# Patient Record
Sex: FEMALE | Race: BLACK OR AFRICAN AMERICAN | NOT HISPANIC OR LATINO | Employment: FULL TIME | URBAN - METROPOLITAN AREA
[De-identification: names, ages, dates, MRNs, and addresses within clinical notes are randomized per-mention and may not be internally consistent; named-entity substitution may affect disease eponyms.]

---

## 2017-05-09 ENCOUNTER — CLINICAL SUPPORT (OUTPATIENT)
Dept: REHABILITATION | Facility: HOSPITAL | Age: 32
End: 2017-05-09
Attending: ORTHOPAEDIC SURGERY
Payer: MEDICAID
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
## 2017-05-09 DIAGNOSIS — M25.673 DECREASED RANGE OF MOTION OF ANKLE: ICD-10-CM

## 2017-05-09 DIAGNOSIS — M21.372 FOOT DROP, LEFT FOOT: ICD-10-CM

## 2017-05-09 DIAGNOSIS — M25.669 DECREASED RANGE OF MOTION (ROM) OF KNEE: ICD-10-CM

## 2017-05-09 PROCEDURE — 97110 THERAPEUTIC EXERCISES: CPT | Mod: PN

## 2017-05-09 PROCEDURE — G8978 MOBILITY CURRENT STATUS: HCPCS | Mod: CL,PN

## 2017-05-09 PROCEDURE — G8979 MOBILITY GOAL STATUS: HCPCS | Mod: CK,PN

## 2017-05-09 PROCEDURE — 97161 PT EVAL LOW COMPLEX 20 MIN: CPT | Mod: PN
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
## 2017-05-11 ENCOUNTER — CLINICAL SUPPORT (OUTPATIENT)
Dept: REHABILITATION | Facility: HOSPITAL | Age: 32
End: 2017-05-11
Attending: ORTHOPAEDIC SURGERY
Payer: MEDICAID

## 2017-05-11 DIAGNOSIS — M21.372 FOOT DROP, LEFT FOOT: ICD-10-CM

## 2017-05-11 DIAGNOSIS — M25.669 DECREASED RANGE OF MOTION (ROM) OF KNEE: ICD-10-CM

## 2017-05-11 DIAGNOSIS — M25.673 DECREASED RANGE OF MOTION OF ANKLE: ICD-10-CM

## 2017-05-11 PROCEDURE — 97116 GAIT TRAINING THERAPY: CPT | Mod: PN

## 2017-05-11 PROCEDURE — 97110 THERAPEUTIC EXERCISES: CPT | Mod: PN

## 2017-05-11 NOTE — PROGRESS NOTES
"Name: Kath Mcclellan  [facility] Number: [id number]  Date of Treatment: 05/11/2017   Diagnosis:   Encounter Diagnoses   Name Primary?    Foot drop, left foot     Decreased range of motion (ROM) of knee     Decreased range of motion of ankle        Time in: 1605  Time Out: 1710  Total Treatment Time: 65  Group Time: 0      Subjective:    Kath reports needing assist with using stairs.  Patient reports their pain to be 0/10 on a 0-10 scale with 0 being no pain and 10 being the worst pain imaginable.    Objective    Kath received therapeutic exercises to develop strength, endurance, flexibility, posture and balance for 40 minutes including:     Hamstring stretch 3x30" L long sit  Gastroc/plantar fascia stretch 3x30" L long sit with towel  Supine ex 2x10: AP, Heel slide, SLR with min assist  Sit ex 2x10: AP, LAQ with assist, ball squeeze    Gait training 25 minutes with B axillary crutches, CGA, cueing for sequencing 150 feet. Amb with ' with cues for sequencing to increase L LE WB.  Up/down 6 steps with B axillary crutches CGA and cueing for sequencing    Written Home Exercises Provided: yes    Pt demo good understanding of the education provided. Kath demonstrated fair return demonstration of activities with assist and cueing.     Assessment:     Pt will continue to benefit from skilled PT intervention. Medical Necessity is demonstrated by:  Fall Risk, Unable to participate in daily activities, Continued inability to participate in vocational pursuits, Pain limits function of effected part for some activities, Unable to participate fully in daily activities, Requires skilled supervision to complete and progress HEP and Weakness.    Plan:    Continue with established Plan of Care towards PT goals.   "

## 2017-05-15 ENCOUNTER — CLINICAL SUPPORT (OUTPATIENT)
Dept: REHABILITATION | Facility: HOSPITAL | Age: 32
End: 2017-05-15
Attending: ORTHOPAEDIC SURGERY
Payer: MEDICAID

## 2017-05-15 DIAGNOSIS — M25.669 DECREASED RANGE OF MOTION (ROM) OF KNEE: ICD-10-CM

## 2017-05-15 DIAGNOSIS — M25.673 DECREASED RANGE OF MOTION OF ANKLE: ICD-10-CM

## 2017-05-15 DIAGNOSIS — M21.372 FOOT DROP, LEFT FOOT: ICD-10-CM

## 2017-05-15 PROCEDURE — 97110 THERAPEUTIC EXERCISES: CPT | Mod: PN

## 2017-05-17 ENCOUNTER — CLINICAL SUPPORT (OUTPATIENT)
Dept: REHABILITATION | Facility: HOSPITAL | Age: 32
End: 2017-05-17
Attending: ORTHOPAEDIC SURGERY
Payer: MEDICAID

## 2017-05-17 DIAGNOSIS — M21.372 FOOT DROP, LEFT FOOT: ICD-10-CM

## 2017-05-17 DIAGNOSIS — M25.669 DECREASED RANGE OF MOTION (ROM) OF KNEE: ICD-10-CM

## 2017-05-17 DIAGNOSIS — M25.673 DECREASED RANGE OF MOTION OF ANKLE: ICD-10-CM

## 2017-05-17 PROCEDURE — 97110 THERAPEUTIC EXERCISES: CPT | Mod: PN

## 2017-05-17 NOTE — PROGRESS NOTES
"Name: Kath Mcclellan  [facility] Number: [id number]  Date of Treatment: 05/17/2017   Diagnosis:   Encounter Diagnoses   Name Primary?    Foot drop, left foot     Decreased range of motion (ROM) of knee     Decreased range of motion of ankle        Time in: 1110  Time Out: 1205  Total Treatment Time: 55  Group Time: 0    Subjective:    Kath reports improvement of symptoms.  Patient reports their pain to be 0/10 on a 0-10 scale with 0 being no pain and 10 being the worst pain imaginable.    Objective    Kath received therapeutic exercises to develop strength, endurance and flexibility for 55 minutes including:     Bike Lv1 10' without rotation  Gastroc stretch in sit with towel 3x30"  SAQ 3x10 B  SAQ with ball squeeze 3x10 B  LAQ 3x10 B  SLR 3x10 L with VC to decrease L hip IR    Gair training Axillary crutches 2x75' with cues to increase L hip flexion(dec L IR), decrease L step length and increase R step length      Pt demo good understanding of the education provided. Kath demonstrated good return demonstration of activities.     Assessment:     Pt will continue to benefit from skilled PT intervention. Medical Necessity is demonstrated by:  Continued inability to participate in vocational pursuits, Unable to participate fully in daily activities, Requires skilled supervision to complete and progress HEP and Weakness.    Patient is making fair progress towards established goals.    Plan:    Continue with established Plan of Care towards PT goals.   "

## 2017-05-22 ENCOUNTER — CLINICAL SUPPORT (OUTPATIENT)
Dept: REHABILITATION | Facility: HOSPITAL | Age: 32
End: 2017-05-22
Attending: ORTHOPAEDIC SURGERY
Payer: MEDICAID

## 2017-05-22 DIAGNOSIS — R26.9 GAIT ABNORMALITY: Primary | ICD-10-CM

## 2017-05-22 PROCEDURE — 97110 THERAPEUTIC EXERCISES: CPT | Mod: PN

## 2017-05-22 NOTE — PROGRESS NOTES
05/22/17 1100   Ankle Exercises   Double Leg Calf Raises Reps/Sets/Weight 30   Standing Dorsiflexion Stretch(Gastroc) Reps/Sets/Hold Time 30   Knee Exercises   Frontal Squats Reps/Sets/Weight 30   Additional Exercises   Additional Exercise nustep 12' l5   Additional Exercise MTT 12',GAIT TRAINING, 10', BALANCE EX 8'

## 2017-05-22 NOTE — PROGRESS NOTES
TIME RECORD    Date:  05/22/2017    Start Time:  1102  Stop Time:  1155    PROCEDURES:    TIMED  Procedure Time Min.   TE Start:1102  Stop:1155 53    Start:  Stop:     Start:  Stop:     Start:  Stop:          UNTIMED  Procedure Time Min.    Start:  Stop:     Start:  Stop:      Total Timed Minutes:  53  Total Timed Units:  4  Total Untimed Units:  0  Charges Billed/# of units:  4      Progress/Current Status    Subjective:     Patient ID: Kath Mcclellan is a 32 y.o. female.  Diagnosis:   1. Gait abnormality       Pain: 0 /10      Objective:     TE for ROM,strength,gait.    Assessment:     Performed ex well.    Patient Education/Response:     Gait pattern ed..    Plans and Goals:     Cont,progress as able.

## 2017-05-24 ENCOUNTER — CLINICAL SUPPORT (OUTPATIENT)
Dept: REHABILITATION | Facility: HOSPITAL | Age: 32
End: 2017-05-24
Attending: ORTHOPAEDIC SURGERY
Payer: MEDICAID

## 2017-05-24 DIAGNOSIS — M21.372 FOOT DROP, LEFT FOOT: ICD-10-CM

## 2017-05-24 DIAGNOSIS — M25.669 DECREASED RANGE OF MOTION (ROM) OF KNEE: ICD-10-CM

## 2017-05-24 DIAGNOSIS — M25.673 DECREASED RANGE OF MOTION OF ANKLE: ICD-10-CM

## 2017-05-24 PROCEDURE — 97110 THERAPEUTIC EXERCISES: CPT | Mod: PN

## 2017-05-24 NOTE — PROGRESS NOTES
"Name: Kath Mcclellan  [facility] Number: [id number]  Date of Treatment: 05/24/2017   Diagnosis:   Encounter Diagnoses   Name Primary?    Foot drop, left foot     Decreased range of motion (ROM) of knee     Decreased range of motion of ankle        Time in: 1100  Time Out: 1158  Total Treatment Time: 58      Subjective:    Kath reports "my leg doesn't hurt, I just don't trust it."  Patient reports their pain to be 0/10 on a 0-10 scale with 0 being no pain and 10 being the worst pain imaginable.    Objective    Patient received individual therapy to increase strength, endurance, ROM, flexibility and gait training with activities as follows:     Kath received therapeutic exercises to develop strength, endurance, ROM, flexibility and gait training for 58 minutes including:     Gait training: ~75' x 2 with AC    // bars with mirror for biofeedback:  Stepping forward/backward with L LE with emphasis on HS and knee flexion 2x10  HSC R/L with AAROM on L 2x10 each  Standing hip flexion R/L 2x10 each  Fwd/retro walks ~8' x2 each    Seated heel slide L 10x10" with over pressure from R LE  LAQ L LE 2x10      Written Home Exercises Provided: Continue HEP  Pt demo good understanding of the education provided. Kath demonstrated good return demonstration of activities.     Assessment:     Pt ambulated from waiting room with WBOS using AC.  Decreased step length with step to gait pattern on R noted as well as compensation from L hip to advance LE. Pt ambulated with decreased knee flexion, and decreased WB on L and increased WB through UE's.   Cues were provided to increase step length on R LE and to promote HS.  Pt demonstrated ability to DF toes on L actively while standing, not during ambulation.  AC readjusted to appropriate height and pt instructed to ambulate with AC closer to body.  Cues provided to increase step length on R with good results.      Mirror provided as biofeedback in // bars, with good results for awareness " of compensatory patterns.  Static standing with even weight distribution encouraged in  // bars due to tendency to shift pelvis/shoulders to R and WB through R LE.  Pt required cues in standing exercises to prevent hip rotation/elevation, encourage HS, and encourage knee flexion.  Tactile cues provided to promote increased ROM during HSC.  Pt responded well to all cues and demonstrated improved technique.      Pt required V/C to decrease WB through UE's.  Pt hesitant to perform hip flexion on R LE due to fear of supporting weight on L LE, SBA was provided, and after initial ~2 reps, pt was able to complete exercise without hesitation and weight bearing on L improved significantly.  Pt ambulated with improved step length, improved HS, improved WB on L LE, decreased WB on UE's with AC, and decreased compensation from pelvis at end of therapy.  Pt was able to ambulate with knee ext, HS, and DF upon completion of therapy with AC.  Pt was able to tolerate exercises without increase in s/s.     Pt will continue to benefit from skilled PT intervention. Medical Necessity is demonstrated by:  Fall Risk, Unable to participate fully in daily activities, Requires skilled supervision to complete and progress HEP and Weakness.    Patient is making good progress towards established goals.      Plan:  Continue with established Plan of Care towards PT goals.   I certify that I was present in the room directing the student in service delivery and guiding them using my skilled judgment. As the co-signing therapist I have reviewed the students documentation and am responsible for the treatment, assessment, and plan.

## 2017-05-29 ENCOUNTER — CLINICAL SUPPORT (OUTPATIENT)
Dept: REHABILITATION | Facility: HOSPITAL | Age: 32
End: 2017-05-29
Attending: ORTHOPAEDIC SURGERY
Payer: MEDICAID

## 2017-05-29 DIAGNOSIS — R26.9 GAIT ABNORMALITY: Primary | ICD-10-CM

## 2017-05-29 PROCEDURE — 97110 THERAPEUTIC EXERCISES: CPT | Mod: PN

## 2017-05-29 NOTE — PROGRESS NOTES
TIME RECORD    Date:  05/29/2017    Start Time:  1303  Stop Time:  1356    PROCEDURES:    TIMED  Procedure Time Min.   TE Start:1303  Stop:1356 53    Start:  Stop:     Start:  Stop:     Start:  Stop:          UNTIMED  Procedure Time Min.    Start:  Stop:     Start:  Stop:      Total Timed Minutes:  53  Total Timed Units:  4  Total Untimed Units:  0  Charges Billed/# of units:  4      Progress/Current Status    Subjective:     Patient ID: Kath Mcclellan is a 32 y.o. female.  Diagnosis:   1. Gait abnormality       Pain: 0 /10      Objective:     TE for ROM,strength, gait, gait training without AD.     Assessment:     Good progress.    Patient Education/Response:     Gait pattern ed.    Plans and Goals:     Cont per POC.

## 2017-05-29 NOTE — PROGRESS NOTES
05/29/17 1300   Ankle Exercises   Double Leg Calf Raises Reps/Sets/Weight 30   Standing Dorsiflexion Stretch(Gastroc) Reps/Sets/Hold Time 3X30   Knee Exercises   Frontal Squats Reps/Sets/Weight 30   Tg/Shuttle/Reformer Squats Reps/Sets/Weight/Level 50# 7'   Additional Exercises   Additional Exercise NUSTEP 12' L6   Additional Exercise GAIT TRAINING WITHOUT AD   Additional Exercise DF-100 22# 3X10.

## 2017-05-31 ENCOUNTER — CLINICAL SUPPORT (OUTPATIENT)
Dept: REHABILITATION | Facility: HOSPITAL | Age: 32
End: 2017-05-31
Attending: ORTHOPAEDIC SURGERY
Payer: MEDICAID

## 2017-05-31 DIAGNOSIS — M21.372 FOOT DROP, LEFT FOOT: ICD-10-CM

## 2017-05-31 DIAGNOSIS — M25.673 DECREASED RANGE OF MOTION OF ANKLE: ICD-10-CM

## 2017-05-31 DIAGNOSIS — M25.669 DECREASED RANGE OF MOTION (ROM) OF KNEE: ICD-10-CM

## 2017-05-31 PROCEDURE — 97110 THERAPEUTIC EXERCISES: CPT | Mod: PN

## 2017-05-31 NOTE — PROGRESS NOTES
"Name: Kath Mcclellan  [facility] Number: [id number]  Date of Treatment: 05/31/2017   Diagnosis:   Encounter Diagnoses   Name Primary?    Foot drop, left foot     Decreased range of motion (ROM) of knee     Decreased range of motion of ankle        Time in: 1100  Time Out: 1150  Total Treatment Time: 50      Subjective:    Kath reports she cleaned her house this morning.  Pt reports feeling tired.  Patient reports their pain to be 0/10 on a 0-10 scale with 0 being no pain and 10 being the worst pain imaginable.    Objective    Patient received individual therapy to increase strength, endurance, ROM, flexibility and gait training with activities as follows:     Kath received therapeutic exercises to develop strength, endurance, ROM, flexibility and gait trainig for 50 minutes including:     NuStep level 3 x 10'  Standing B gastroc stretch on 1/2 roll in // bars 3x30"  Gait training with single AC 20'  Fwd/back step L in // bars 2x10 with single HHA on bar  Standing hip ext R/L in // bars with single HHA on bar 3x10 each  Mini squats in // bars 3x10 w/TC to maintain hips in midline  Standing HSC L 2x10  LAQ R/L 3x10  Gait training with AC 75' x 2    Written Home Exercises Provided: Continue HEP  Pt demo good understanding of the education provided. Kath demonstrated good return demonstration of activities.     Assessment:     Pt required V/C to prevent excessive L hip elevation/rotation during stepping exercise.  Pt was able to ambulate 20' with single AC without increase in s/s.  Tactile cues provided to maintain neutral hip alignment during squats and to promote equal weight distribution through B LE's.  AAROM at end range of HSC provided for ~10 reps until pt was able to improve AROM independently.  Pt required increased rest breaks during HSC due to fatigue, performed rest of exercises seated.  Therapy ended early due to patient fatigue level.     Pt will continue to benefit from skilled PT intervention. " Medical Necessity is demonstrated by:  Unable to participate fully in daily activities, Requires skilled supervision to complete and progress HEP and Weakness.    Patient is making good progress towards established goals.      Plan:  Continue with established Plan of Care towards PT goals.   I certify that I was present in the room directing the student in service delivery and guiding them using my skilled judgment. As the co-signing therapist I have reviewed the students documentation and am responsible for the treatment, assessment, and plan.

## 2017-06-05 ENCOUNTER — CLINICAL SUPPORT (OUTPATIENT)
Dept: REHABILITATION | Facility: HOSPITAL | Age: 32
End: 2017-06-05
Attending: ORTHOPAEDIC SURGERY
Payer: MEDICAID

## 2017-06-05 DIAGNOSIS — M25.673 DECREASED RANGE OF MOTION OF ANKLE: ICD-10-CM

## 2017-06-05 DIAGNOSIS — M25.669 DECREASED RANGE OF MOTION (ROM) OF KNEE: ICD-10-CM

## 2017-06-05 DIAGNOSIS — M21.372 FOOT DROP, LEFT FOOT: ICD-10-CM

## 2017-06-05 PROCEDURE — 97110 THERAPEUTIC EXERCISES: CPT | Mod: PN

## 2017-06-05 NOTE — PROGRESS NOTES
"Name: Kath Mcclellan  [facility] Number: [id number]  Date of Treatment: 06/05/2017   Diagnosis:   Encounter Diagnoses   Name Primary?    Foot drop, left foot     Decreased range of motion (ROM) of knee     Decreased range of motion of ankle        Time in: 1100  Time Out: 1200  Total Treatment Time: 60  Group Time: 0      Subjective:    Kath reports improvement of symptoms, reports going to gym to ride bike.  Patient reports their pain to be 0/10 on a 0-10 scale with 0 being no pain and 10 being the worst pain imaginable.    Objective    Kath received therapeutic exercises to develop strength, endurance, flexibility and balance for 60 minutes including:     Bike Lv1 10' with seat at 6 without rotation  Hamstring stretch 3x30" B in sit  Calf stretch 3x30" B 1/2 roll  // bar stand ex 2x10: HR/TR,squats,march,hip abd reciprocal,hip ext reciprocal,hamstring curls reciprocal  Prone HS curl L 3x10  Hip 4-way 3x10 L on mat  Heelslide 3x10 L supine    Amb with U axillary crutch on R 2x80'    Written Home Exercises Provided: yes    Pt demo good understanding of the education provided. Kath demonstrated good return demonstration of activities.     Assessment:     Pt will continue to benefit from skilled PT intervention. Medical Necessity is demonstrated by:  Continued inability to participate in vocational pursuits, Unable to participate fully in daily activities, Requires skilled supervision to complete and progress HEP and Weakness.    Patient is making good progress towards established goals.    Plan:    Continue with established Plan of Care towards PT goals.   "

## 2017-06-07 ENCOUNTER — CLINICAL SUPPORT (OUTPATIENT)
Dept: REHABILITATION | Facility: HOSPITAL | Age: 32
End: 2017-06-07
Attending: ORTHOPAEDIC SURGERY
Payer: MEDICAID

## 2017-06-07 DIAGNOSIS — M25.673 DECREASED RANGE OF MOTION OF ANKLE: ICD-10-CM

## 2017-06-07 DIAGNOSIS — M21.372 FOOT DROP, LEFT FOOT: ICD-10-CM

## 2017-06-07 DIAGNOSIS — M25.669 DECREASED RANGE OF MOTION (ROM) OF KNEE: ICD-10-CM

## 2017-06-07 PROCEDURE — 97110 THERAPEUTIC EXERCISES: CPT | Mod: PN

## 2017-06-07 NOTE — PROGRESS NOTES
"Name: Kath Mcclellan  [facility] Number: [id number]  Date of Treatment: 06/07/2017   Diagnosis:   Encounter Diagnoses   Name Primary?    Foot drop, left foot     Decreased range of motion (ROM) of knee     Decreased range of motion of ankle        Time in: 1400  Time Out: 1500  Total Treatment Time: 60      Subjective:    Kath reports doing better overall. "This crutch has been bothering me for two days."  Pt arrived to therapy with R axillary crutch.  Patient reports their pain to be 0/10 on a 0-10 scale with 0 being no pain and 10 being the worst pain imaginable.    Objective    Patient received individual therapy to increase strength, endurance and balance, gait with activities as follows:     Kath received therapeutic exercises to develop strength, endurance and balance, gait for 60 minutes including:     Bike level 1 x 10 minutes, seat at 6, then ending at 2  Mini squats x 30  Step to 6" step x 30  Step up 4' step x 30  Standing HSC x 30  Sit-stand w/LLE back further than R x 5  Shuttle: 50# B, 25# L x 30 each; heel dips 25# x 30 B      VC during ambulation to decrease weight shift to R, increase knee flexion with toe off, increase heel strike and hip flexion with swing through and heel strike phase of ambulation.  VC to relax shoulders, move R crutch with LLE.  Pt was able to return demonstrate proper gait technique.    Written Home Exercises Provided: cont HEP  Pt demo good understanding of the education provided. Kath demonstrated good return demonstration of activities.     Assessment:     Pt fatigues quickly.  Needs frequent rest breaks.    Pt will continue to benefit from skilled PT intervention. Medical Necessity is demonstrated by:  Fall Risk, Pain limits function of effected part for some activities, Unable to participate fully in daily activities, Requires skilled supervision to complete and progress HEP.    Patient is making good progress towards established goals.      Plan:  Continue with " established Plan of Care towards PT goals.

## 2017-06-12 ENCOUNTER — CLINICAL SUPPORT (OUTPATIENT)
Dept: REHABILITATION | Facility: HOSPITAL | Age: 32
End: 2017-06-12
Attending: ORTHOPAEDIC SURGERY
Payer: MEDICAID

## 2017-06-12 DIAGNOSIS — M25.669 DECREASED RANGE OF MOTION (ROM) OF KNEE: ICD-10-CM

## 2017-06-12 DIAGNOSIS — M21.372 FOOT DROP, LEFT FOOT: ICD-10-CM

## 2017-06-12 DIAGNOSIS — M25.673 DECREASED RANGE OF MOTION OF ANKLE: ICD-10-CM

## 2017-06-12 PROCEDURE — 97110 THERAPEUTIC EXERCISES: CPT | Mod: PN

## 2017-06-12 NOTE — PROGRESS NOTES
Name: Kath Mcclellan  [facility] Number: [id number]  Date of Treatment: 06/12/2017   Diagnosis:   Encounter Diagnoses   Name Primary?    Foot drop, left foot     Decreased range of motion (ROM) of knee     Decreased range of motion of ankle        Time in: 1058  Time Out: 1155  Total Treatment Time: 57      Subjective:    Kath reports pain L hamstring region this past weekend.  Patient reports their pain to be 0/10 on a 0-10 scale with 0 being no pain and 10 being the worst pain imaginable.    Objective    Patient received individual therapy to increase strength, endurance, flexibility and balance, gait with activities as follows:     Kath received therapeutic exercises to develop strength, endurance, flexibility and balance, gait for 42 minutes including:     EFX x 10 minutes  Shuttle: 50# B, 31# L, 31# heel dips x 30 each  [employer] flexion, extension 11# x 30 each  Mini squats w/R forward, x 30  SLS 3 x 30 sec L  Gait training w/SPC, 80' total, VC for correct technique    Kath received the following manual therapy techniques: Scar massage anterior knee, desensitization L ant thigh, medial/lateral knee and lower leg, STM inferior gastroc region,PROM knee flexion for 15 minutes.     Written Home Exercises Provided: cont HEP, instructed in desensitization LLE, toe raises  Pt demo good understanding of the education provided. Kath demonstrated good return demonstration of activities.     Assessment:     Sensitivity with MT, which decreased after MT completed.  Decreased heel strike and knee extension, increased trunk forward thrust with LLE swing through and heel strike phase.  Good tolerance for activity.  Making good progress overall.    Pt will continue to benefit from skilled PT intervention. Medical Necessity is demonstrated by:  Fall Risk, Unable to participate fully in daily activities, Requires skilled supervision to complete and progress HEP and Weakness.    Patient is making good progress towards  established goals.      Plan:  Continue with established Plan of Care towards PT goals.

## 2017-06-20 ENCOUNTER — CLINICAL SUPPORT (OUTPATIENT)
Dept: REHABILITATION | Facility: HOSPITAL | Age: 32
End: 2017-06-20
Attending: ORTHOPAEDIC SURGERY
Payer: MEDICAID
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
## 2017-06-20 PROCEDURE — 97110 THERAPEUTIC EXERCISES: CPT | Mod: PN
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
## 2017-06-22 ENCOUNTER — CLINICAL SUPPORT (OUTPATIENT)
Dept: REHABILITATION | Facility: HOSPITAL | Age: 32
End: 2017-06-22
Attending: ORTHOPAEDIC SURGERY
Payer: MEDICAID

## 2017-06-22 DIAGNOSIS — M21.372 FOOT DROP, LEFT FOOT: ICD-10-CM

## 2017-06-22 DIAGNOSIS — M25.673 DECREASED RANGE OF MOTION OF ANKLE: ICD-10-CM

## 2017-06-22 DIAGNOSIS — M25.669 DECREASED RANGE OF MOTION (ROM) OF KNEE: ICD-10-CM

## 2017-06-22 PROCEDURE — 97110 THERAPEUTIC EXERCISES: CPT | Mod: PN

## 2017-06-22 NOTE — PROGRESS NOTES
"Name: Kath Mcclellan  [facility] Number: [id number]  Date of Treatment: 06/22/2017   Diagnosis:   Encounter Diagnoses   Name Primary?    Foot drop, left foot     Decreased range of motion (ROM) of knee     Decreased range of motion of ankle        Time in: 1510  Time Out: 1600  Total Treatment Time: 50  Group Time: 0      Subjective:    Kath reports improvement of symptoms, reports doing HEP.  Patient reports their pain to be 0/10 on a 0-10 scale with 0 being no pain and 10 being the worst pain imaginable.    Objective    Kath received therapeutic exercises to develop strength, endurance, flexibility, posture and balance for 50 minutes including:     Bike Lv3 10'  Amb with ' SBA VC to decrease ELICIA  Up/Down 21 6" steps with SC, step through up, step to down  Shuttle x30: 50# B squat, 37# L squat, 37# B HR  Hip 4-way 2# L 3x10  Step/over 4" box with R LE with R UE support 3x10  March x20  Hamstring curl x20    Pt demo good understanding of the education provided. aKth demonstrated good return demonstration of activities.     Assessment:     Pt will continue to benefit from skilled PT intervention. Medical Necessity is demonstrated by:  Continued inability to participate in vocational pursuits, Unable to participate fully in daily activities, Requires skilled supervision to complete and progress HEP and Weakness.    Patient is making good progress towards established goals.    Plan:    Continue with established Plan of Care towards PT goals.   "

## 2017-06-26 ENCOUNTER — CLINICAL SUPPORT (OUTPATIENT)
Dept: REHABILITATION | Facility: HOSPITAL | Age: 32
End: 2017-06-26
Attending: ORTHOPAEDIC SURGERY
Payer: MEDICAID

## 2017-06-26 DIAGNOSIS — M21.372 FOOT DROP, LEFT FOOT: ICD-10-CM

## 2017-06-26 DIAGNOSIS — M25.669 DECREASED RANGE OF MOTION (ROM) OF KNEE: ICD-10-CM

## 2017-06-26 DIAGNOSIS — M25.673 DECREASED RANGE OF MOTION OF ANKLE: ICD-10-CM

## 2017-06-26 PROCEDURE — 97110 THERAPEUTIC EXERCISES: CPT | Mod: PN

## 2017-06-26 NOTE — PROGRESS NOTES
Name: Kath Mcclellan  [facility] Number: [id number]  Date of Treatment: 06/26/2017   Diagnosis:   Encounter Diagnoses   Name Primary?    Foot drop, left foot     Decreased range of motion (ROM) of knee     Decreased range of motion of ankle        Time in: 1102  Time Out: 1150  Total Treatment Time: 48      Subjective:    Kath reports pain with sciatica today.  Patient reports their pain to be 0/10 on a 0-10 scale with 0 being no pain and 10 being the worst pain imaginable.  0/10 in leg, 2/10 in sciatica     Objective    Patient received individual therapy to increase strength, flexibility and gait with activities as follows:     Kath received therapeutic exercises to develop strength, flexibility and gait for 38 minutes including:     Nustep level 3 x 10 minutes  Standing gastroc stretch 3 x 30 sec B  Shuttle: 56# B, 37# L x 30 each  SportsArt: flexion, extension 11# x 30 each LLE    Kath received the following manual therapy techniques: scar massage were applied to the: L knee incision and L anterior tib scar region for 10 minutes.     Written Home Exercises Provided: cont HEP  Pt demo good understanding of the education provided. Kath demonstrated good return demonstration of activities.     Assessment:     Limited activity level today due to sciatic pain today.  Scar heigh and improved skin mobility at anterior tib region decreased after MT.    Pt will continue to benefit from skilled PT intervention. Medical Necessity is demonstrated by:  Fall Risk, Pain limits function of effected part for some activities, Unable to participate fully in daily activities, Requires skilled supervision to complete and progress HEP and Weakness.    Patient is making good progress towards established goals.      Plan:  Continue with established Plan of Care towards PT goals.

## 2017-06-28 ENCOUNTER — CLINICAL SUPPORT (OUTPATIENT)
Dept: REHABILITATION | Facility: HOSPITAL | Age: 32
End: 2017-06-28
Attending: ORTHOPAEDIC SURGERY
Payer: MEDICAID
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
## 2017-06-28 PROCEDURE — G8979 MOBILITY GOAL STATUS: HCPCS | Mod: CJ,PN

## 2017-06-28 PROCEDURE — G8978 MOBILITY CURRENT STATUS: HCPCS | Mod: CJ,PN

## 2017-06-28 PROCEDURE — 97110 THERAPEUTIC EXERCISES: CPT | Mod: PN

## 2017-06-28 PROCEDURE — 97161 PT EVAL LOW COMPLEX 20 MIN: CPT | Mod: PN
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
## 2017-06-28 NOTE — PROGRESS NOTES
Patient would benefit from further PT visits to cont. addressing ROM/strength/mobility deficits.  Pls see POC update.

## 2017-07-03 ENCOUNTER — APPOINTMENT (OUTPATIENT)
Dept: REHABILITATION | Facility: HOSPITAL | Age: 32
End: 2017-07-03
Attending: ORTHOPAEDIC SURGERY
Payer: MEDICAID

## 2017-07-06 ENCOUNTER — TELEPHONE (OUTPATIENT)
Dept: REHABILITATION | Facility: HOSPITAL | Age: 32
End: 2017-07-06

## 2017-07-10 ENCOUNTER — CLINICAL SUPPORT (OUTPATIENT)
Dept: REHABILITATION | Facility: HOSPITAL | Age: 32
End: 2017-07-10
Attending: ORTHOPAEDIC SURGERY
Payer: MEDICAID

## 2017-07-10 DIAGNOSIS — M25.673 DECREASED RANGE OF MOTION OF ANKLE: ICD-10-CM

## 2017-07-10 DIAGNOSIS — M25.669 DECREASED RANGE OF MOTION (ROM) OF KNEE: ICD-10-CM

## 2017-07-10 DIAGNOSIS — M21.372 FOOT DROP, LEFT FOOT: ICD-10-CM

## 2017-07-10 PROCEDURE — 97110 THERAPEUTIC EXERCISES: CPT | Mod: PN
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
## 2017-07-12 ENCOUNTER — CLINICAL SUPPORT (OUTPATIENT)
Dept: REHABILITATION | Facility: HOSPITAL | Age: 32
End: 2017-07-12
Attending: ORTHOPAEDIC SURGERY
Payer: MEDICAID

## 2017-07-12 DIAGNOSIS — S82.402E TIBIA/FIBULA FRACTURE, LEFT, OPEN TYPE I OR II, WITH ROUTINE HEALING, SUBSEQUENT ENCOUNTER: ICD-10-CM
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
## 2017-07-12 DIAGNOSIS — S82.202E TIBIA/FIBULA FRACTURE, LEFT, OPEN TYPE I OR II, WITH ROUTINE HEALING, SUBSEQUENT ENCOUNTER: ICD-10-CM

## 2017-07-12 PROCEDURE — 97110 THERAPEUTIC EXERCISES: CPT | Mod: PN
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
## 2017-07-19 ENCOUNTER — CLINICAL SUPPORT (OUTPATIENT)
Dept: REHABILITATION | Facility: HOSPITAL | Age: 32
End: 2017-07-19
Attending: ORTHOPAEDIC SURGERY
Payer: MEDICAID

## 2017-07-19 DIAGNOSIS — M25.669 DECREASED RANGE OF MOTION (ROM) OF KNEE: ICD-10-CM

## 2017-07-19 DIAGNOSIS — R26.9 GAIT ABNORMALITY: ICD-10-CM

## 2017-07-19 DIAGNOSIS — M25.673 DECREASED RANGE OF MOTION OF ANKLE: ICD-10-CM

## 2017-07-19 DIAGNOSIS — M21.372 FOOT DROP, LEFT FOOT: ICD-10-CM

## 2017-07-19 PROCEDURE — 97110 THERAPEUTIC EXERCISES: CPT | Mod: PN

## 2017-07-21 ENCOUNTER — CLINICAL SUPPORT (OUTPATIENT)
Dept: REHABILITATION | Facility: HOSPITAL | Age: 32
End: 2017-07-21
Attending: ORTHOPAEDIC SURGERY
Payer: MEDICAID

## 2017-07-21 DIAGNOSIS — M21.372 FOOT DROP, LEFT FOOT: ICD-10-CM

## 2017-07-21 DIAGNOSIS — M25.673 DECREASED RANGE OF MOTION OF ANKLE: ICD-10-CM

## 2017-07-21 DIAGNOSIS — M25.669 DECREASED RANGE OF MOTION (ROM) OF KNEE: ICD-10-CM

## 2017-07-21 DIAGNOSIS — R26.9 GAIT ABNORMALITY: ICD-10-CM

## 2017-07-21 PROCEDURE — 97110 THERAPEUTIC EXERCISES: CPT | Mod: PN

## 2017-07-21 NOTE — PROGRESS NOTES
"Name: Kath Mcclellan  [facility] Number: [id number]  Date of Treatment: 07/21/2017   Diagnosis:   Encounter Diagnoses   Name Primary?    Gait abnormality     Foot drop, left foot     Decreased range of motion (ROM) of knee     Decreased range of motion of ankle        Time in: 1200  Time Out: 1300  Total Treatment Time: 60      Subjective:    Kath reports "I went out last night.  I'm just tired."  Patient reports their pain to be 0/10 on a 0-10 scale with 0 being no pain and 10 being the worst pain imaginable.    Objective    Patient received individual therapy to increase strength, flexibility and gait with activities as follows:     Kath received therapeutic exercises to develop strength, flexibility and gait for 50 minutes including:     EFX 5/5  Hip hike 4" step x 30 B  HR on 2" step x 30 B  Standing hip abduction w/GTB x 30 B  Lateral side stepping w/GTB around ankles 35' R/L x 2 each direction  Monster walk w/GTB around ankles 30' x 2  Walking lunges 25' x 4  Ball wall squats x 30  Ball wall squats w/10 sec hold x 3  Shuttle: 62# B, 37# L, 37# heel dips x 30 each  SportsArt: 11# flexion, extension L x 30 each  Bridges with DKC with ball x 30    STM L medial/superior tibial region x 10 minutes    Written Home Exercises Provided: cont HEP  Pt demo good understanding of the education provided. Kath demonstrated good return demonstration of activities.     Assessment:     Good improvement in B hip strength.  Pt with decreased lateral shift during stance phase LLE.  Pt will continue to benefit from skilled PT intervention. Medical Necessity is demonstrated by:  Unable to participate fully in daily activities, Requires skilled supervision to complete and progress HEP and Weakness.    Patient is making good progress towards established goals.      Plan:  Continue with established Plan of Care towards PT goals.   "

## 2017-07-24 ENCOUNTER — CLINICAL SUPPORT (OUTPATIENT)
Dept: REHABILITATION | Facility: HOSPITAL | Age: 32
End: 2017-07-24
Attending: ORTHOPAEDIC SURGERY
Payer: MEDICAID

## 2017-07-24 DIAGNOSIS — M21.372 FOOT DROP, LEFT FOOT: ICD-10-CM

## 2017-07-24 DIAGNOSIS — M25.669 DECREASED RANGE OF MOTION (ROM) OF KNEE: ICD-10-CM

## 2017-07-24 DIAGNOSIS — R26.9 GAIT ABNORMALITY: ICD-10-CM

## 2017-07-24 DIAGNOSIS — M25.673 DECREASED RANGE OF MOTION OF ANKLE: ICD-10-CM

## 2017-07-24 PROCEDURE — 97110 THERAPEUTIC EXERCISES: CPT | Mod: PN

## 2017-07-24 NOTE — PROGRESS NOTES
"Name: Kath Mcclellan  [facility] Number: [id number]  Date of Treatment: 07/24/2017   Diagnosis:   Encounter Diagnoses   Name Primary?    Gait abnormality     Foot drop, left foot     Decreased range of motion (ROM) of knee     Decreased range of motion of ankle        Time in: 1100  Time Out: 1158  Total Treatment Time: 58      Subjective:    Kath reports no complaints.  Patient reports their pain to be 0/10 on a 0-10 scale with 0 being no pain and 10 being the worst pain imaginable.    Objective    Patient received individual therapy to increase strength and endurance with activities as follows:     Kath received therapeutic exercises to develop strength and endurance for 58 minutes including:     EFX 5/5  Pulleys: strap on RLE, 2 plates, hip 4 way on LLE x 30 each  Shuttle: 62# B, 37# L, 37# heel dips x 30 each  BTB around ankles: monster walk 30' x 2, lateral stepping 30' R/L x 2 each  Walking lunges; 30' x 2  Running 30' x 2  Hip hike 4" step x 30 B  TKE 4" step x 30 L  Kinesotaping: scar taping: star pattern to lower/mid tibial region and anterior knee scar reions    Written Home Exercises Provided: cont HEP  Pt demo good understanding of the education provided. Kath demonstrated good return demonstration of activities.     Assessment:     Strength and gait pattern improving.  Decreased lateral trunk lean during ambulation.  Pt will continue to benefit from skilled PT intervention. Medical Necessity is demonstrated by:  Unable to participate fully in daily activities, Requires skilled supervision to complete and progress HEP and Weakness.    Patient is making good progress towards established goals.      Plan:  Continue with established Plan of Care towards PT goals.   "

## 2017-07-31 ENCOUNTER — CLINICAL SUPPORT (OUTPATIENT)
Dept: REHABILITATION | Facility: HOSPITAL | Age: 32
End: 2017-07-31
Attending: ORTHOPAEDIC SURGERY
Payer: MEDICAID
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
## 2017-07-31 DIAGNOSIS — R29.898 DECREASED STRENGTH INVOLVING KNEE JOINT: ICD-10-CM

## 2017-07-31 PROCEDURE — 97110 THERAPEUTIC EXERCISES: CPT | Mod: PN
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
## 2017-08-03 ENCOUNTER — CLINICAL SUPPORT (OUTPATIENT)
Dept: REHABILITATION | Facility: HOSPITAL | Age: 32
End: 2017-08-03
Attending: ORTHOPAEDIC SURGERY
Payer: MEDICAID

## 2017-08-03 DIAGNOSIS — M21.372 FOOT DROP, LEFT FOOT: ICD-10-CM

## 2017-08-03 DIAGNOSIS — R26.9 GAIT ABNORMALITY: ICD-10-CM

## 2017-08-03 DIAGNOSIS — R29.898 DECREASED STRENGTH INVOLVING KNEE JOINT: ICD-10-CM

## 2017-08-03 DIAGNOSIS — M25.669 DECREASED RANGE OF MOTION (ROM) OF KNEE: ICD-10-CM

## 2017-08-03 DIAGNOSIS — M25.673 DECREASED RANGE OF MOTION OF ANKLE: ICD-10-CM

## 2017-08-03 PROCEDURE — 97110 THERAPEUTIC EXERCISES: CPT | Mod: PN

## 2017-08-03 NOTE — PROGRESS NOTES
"Name: Kath Mcclellan  [facility] Number: [id number]  Date of Treatment: 08/03/2017   Diagnosis:   Encounter Diagnoses   Name Primary?    Decreased strength involving knee joint     Gait abnormality     Foot drop, left foot     Decreased range of motion (ROM) of knee     Decreased range of motion of ankle        Time in: 1500  Time Out: 1558  Total Treatment Time: 58      Subjective:    Kath reports tired after being on vacation last few days.  Patient reports their pain to be 0/10 on a 0-10 scale with 0 being no pain and 10 being the worst pain imaginable.    Objective    Patient received individual therapy to increase strength and flexibility with activities as follows:     Kath received therapeutic exercises to develop strength and flexibility for 58 minutes including:     EFX 5/5, RE4  Pulleys: strap on RLE, 2 plates, hip 4 way on LLE x 30 each  Shuttle: 68# B, 37# L, 37# heel dips x 30 each; 25# jumping 1' x 2  BTB around ankles: monster walk 35' x 2, lateral stepping 35' R/L x 2 each  Walking lunges; 25' x 2  Running 30' x 2  Hip hike x 30 B  Ball wall squat 3 x 30 sec  HR 2" step x 30  SportsArt 22# flexion, 11# extension x 30 each  DKC with ball with bridge x 30    Written Home Exercises Provided: cont HEP  Pt demo good understanding of the education provided. Kath demonstrated good return demonstration of activities.     Assessment:     Removed Kinesotape from previous visit.  Scar decreased in firmness.  Making good progress with strength.  Pt will continue to benefit from skilled PT intervention. Medical Necessity is demonstrated by:  Unable to participate fully in daily activities, Requires skilled supervision to complete and progress HEP and Weakness.    Patient is making good progress towards established goals.      Plan:  Continue with established Plan of Care towards PT goals.   "

## 2017-08-09 ENCOUNTER — CLINICAL SUPPORT (OUTPATIENT)
Dept: REHABILITATION | Facility: HOSPITAL | Age: 32
End: 2017-08-09
Attending: ORTHOPAEDIC SURGERY
Payer: MEDICAID

## 2017-08-09 DIAGNOSIS — M25.673 DECREASED RANGE OF MOTION OF ANKLE: ICD-10-CM

## 2017-08-09 DIAGNOSIS — R29.898 DECREASED STRENGTH INVOLVING KNEE JOINT: ICD-10-CM

## 2017-08-09 DIAGNOSIS — M21.372 FOOT DROP, LEFT FOOT: ICD-10-CM

## 2017-08-09 DIAGNOSIS — M25.669 DECREASED RANGE OF MOTION (ROM) OF KNEE: ICD-10-CM

## 2017-08-09 DIAGNOSIS — R26.9 GAIT ABNORMALITY: ICD-10-CM

## 2017-08-09 PROCEDURE — 97110 THERAPEUTIC EXERCISES: CPT | Mod: PN

## 2017-08-09 NOTE — PROGRESS NOTES
Name: Kath Mcclellan  [facility] Number: [id number]  Date of Treatment: 08/09/2017   Diagnosis:   Encounter Diagnoses   Name Primary?    Decreased strength involving knee joint     Gait abnormality     Foot drop, left foot     Decreased range of motion (ROM) of knee     Decreased range of motion of ankle        Time in: 1103  Time Out: 1158  Total Treatment Time: 55      Subjective:    Kath reports tired after vacation this past weekend.  Patient reports their pain to be 0/10 on a 0-10 scale with 0 being no pain and 10 being the worst pain imaginable.    Objective    Patient received individual therapy to increase strength with activities as follows:     Kath received therapeutic exercises to develop strength for 55 minutes including:     EFX 5/5 RE 4  Walking lunges 25' x 2  Shuttle: 68#B, 37# L, 37# heel dip x 30 each; 25# jump 1' x 2  Hip hike x 30 B  Hip flexor stretch tall kneeling L 3 x 30 sec  Ball wall squats x 30  Ball sits 3 x 30 sec  SLR with abd 2# x 30 L  Bridge with DKC with ball x 30  Reverse clams L x 30  SL clams L w/GTB x 30    Written Home Exercises Provided: cont HEP  Pt demo good understanding of the education provided. Kath demonstrated good return demonstration of activities.     Assessment:     L hip weakness remains, especially when pt is fatigued.   Making good progress overall.  Pt will continue to benefit from skilled PT intervention. Medical Necessity is demonstrated by:  Unable to participate fully in daily activities, Requires skilled supervision to complete and progress HEP and Weakness.    Patient is making good progress towards established goals.      Plan:  Continue with established Plan of Care towards PT goals.

## 2017-08-10 ENCOUNTER — CLINICAL SUPPORT (OUTPATIENT)
Dept: REHABILITATION | Facility: HOSPITAL | Age: 32
End: 2017-08-10
Attending: ORTHOPAEDIC SURGERY
Payer: MEDICAID

## 2017-08-10 DIAGNOSIS — R26.9 GAIT ABNORMALITY: ICD-10-CM

## 2017-08-10 DIAGNOSIS — M21.372 FOOT DROP, LEFT FOOT: ICD-10-CM

## 2017-08-10 DIAGNOSIS — M25.669 DECREASED RANGE OF MOTION (ROM) OF KNEE: ICD-10-CM

## 2017-08-10 DIAGNOSIS — R29.898 DECREASED STRENGTH INVOLVING KNEE JOINT: ICD-10-CM
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
## 2017-08-10 DIAGNOSIS — M25.673 DECREASED RANGE OF MOTION OF ANKLE: ICD-10-CM

## 2017-08-10 PROCEDURE — 97110 THERAPEUTIC EXERCISES: CPT | Mod: PN
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
## 2017-08-29 ENCOUNTER — CLINICAL SUPPORT (OUTPATIENT)
Dept: REHABILITATION | Facility: HOSPITAL | Age: 32
End: 2017-08-29
Attending: ORTHOPAEDIC SURGERY
Payer: MEDICAID

## 2017-08-29 DIAGNOSIS — M25.673 DECREASED RANGE OF MOTION OF ANKLE: ICD-10-CM

## 2017-08-29 DIAGNOSIS — M21.372 FOOT DROP, LEFT FOOT: ICD-10-CM

## 2017-08-29 DIAGNOSIS — R26.9 GAIT ABNORMALITY: ICD-10-CM

## 2017-08-29 DIAGNOSIS — R29.898 DECREASED STRENGTH INVOLVING KNEE JOINT: ICD-10-CM

## 2017-08-29 DIAGNOSIS — M25.669 DECREASED RANGE OF MOTION (ROM) OF KNEE: ICD-10-CM

## 2017-08-29 PROCEDURE — 97110 THERAPEUTIC EXERCISES: CPT | Mod: PN

## 2017-08-29 NOTE — PROGRESS NOTES
"Name: Kath Mcclellan  [facility] Number: [id number]  Date of Treatment: 08/29/2017   Diagnosis:   Encounter Diagnoses   Name Primary?    Decreased strength involving knee joint     Gait abnormality     Foot drop, left foot     Decreased range of motion (ROM) of knee     Decreased range of motion of ankle        Time in: 1405  Time Out: 1455  Total Treatment Time: 50  Group Time: 0      Subjective:    Kath reports improvement of symptoms, and not doing HEP "like I should".  Patient reports their pain to be 0/10 on a 0-10 scale with 0 being no pain and 10 being the worst pain imaginable.    Objective    Kath received therapeutic exercises to develop strength, endurance and flexibility for 50 minutes including:     EFX 5'/5'  Hamstring stretch 3x30" R/L longsit  Calf stretch 3x30" 1/2 roll R/L  TM incline 3, 2.7 mph 10' with VC to increase L toe off  Shuttle 37# B jump 2x30  Forward lunge 2x80'  Forward Scoot on stool 2x100'  Up/Down 6 steps x4 step through    Gait training 2x100' with VC to increase L toe off    Pt demo good understanding of the education provided. Kath demonstrated good return demonstration of activities with cues for proper form.     Assessment:     Pt will continue to benefit from skilled PT intervention. Medical Necessity is demonstrated by:  Unable to participate fully in daily activities, Requires skilled supervision to complete and progress HEP and Weakness.    Patient is making good progress towards established goals.    Plan:    Continue with established Plan of Care towards PT goals.   "

## 2017-09-21 ENCOUNTER — DOCUMENTATION ONLY (OUTPATIENT)
Dept: REHABILITATION | Facility: HOSPITAL | Age: 32
End: 2017-09-21

## 2017-09-21 NOTE — PROGRESS NOTES
REHAB SERVICES OUTPATIENT DISCHARGE SUMMARY  Physical Therapy      Name:  Kath Mcclellan  Date:  09/21/17  Date of Evaluation:  05/09/17  Physician:  Dr. Amauri Couch  Total # Of Visits:  25  Cancelled:  7  No Shows:  0  Diagnosis:  No diagnosis found.    Physical/Functional Status:  At time of discharge, patient was able to sherwin. x 60 min of tx time.    The patient is to be discharged from our Therapy service for the following reason(s):  Patient has not attended therapy since 08/29/17.    Degree of Goal Achievement:  Patient has partially met goals    Patient Education:  NA    Discharge Plan:  Other:  Follow up w/ MD

## 2020-03-16 ENCOUNTER — OFFICE VISIT (OUTPATIENT)
Dept: URGENT CARE | Facility: CLINIC | Age: 35
End: 2020-03-16
Payer: MEDICAID

## 2020-03-16 VITALS
DIASTOLIC BLOOD PRESSURE: 87 MMHG | HEIGHT: 63 IN | WEIGHT: 154 LBS | OXYGEN SATURATION: 97 % | SYSTOLIC BLOOD PRESSURE: 121 MMHG | RESPIRATION RATE: 18 BRPM | HEART RATE: 106 BPM | TEMPERATURE: 98 F | BODY MASS INDEX: 27.29 KG/M2

## 2020-03-16 DIAGNOSIS — R50.9 FEVER AND CHILLS: Primary | ICD-10-CM

## 2020-03-16 DIAGNOSIS — R50.9 FEVER AND CHILLS: ICD-10-CM

## 2020-03-16 DIAGNOSIS — R05.9 COUGH: ICD-10-CM

## 2020-03-16 DIAGNOSIS — B96.89 ACUTE BACTERIAL BRONCHITIS: ICD-10-CM

## 2020-03-16 DIAGNOSIS — J20.8 ACUTE BACTERIAL BRONCHITIS: ICD-10-CM

## 2020-03-16 DIAGNOSIS — R05.9 COUGH: Primary | ICD-10-CM

## 2020-03-16 LAB
CTP QC/QA: YES
FLUAV AG NPH QL: NEGATIVE
FLUBV AG NPH QL: NEGATIVE

## 2020-03-16 PROCEDURE — 87804 INFLUENZA ASSAY W/OPTIC: CPT | Mod: QW,S$GLB,, | Performed by: EMERGENCY MEDICINE

## 2020-03-16 PROCEDURE — 87804 POCT INFLUENZA A/B: ICD-10-PCS | Mod: QW,S$GLB,, | Performed by: EMERGENCY MEDICINE

## 2020-03-16 PROCEDURE — 99214 OFFICE O/P EST MOD 30 MIN: CPT | Mod: 25,S$GLB,, | Performed by: EMERGENCY MEDICINE

## 2020-03-16 PROCEDURE — 99214 PR OFFICE/OUTPT VISIT, EST, LEVL IV, 30-39 MIN: ICD-10-PCS | Mod: 25,S$GLB,, | Performed by: EMERGENCY MEDICINE

## 2020-03-16 PROCEDURE — U0002 COVID-19 LAB TEST NON-CDC: HCPCS

## 2020-03-16 RX ORDER — DOXYCYCLINE 100 MG/1
100 CAPSULE ORAL 2 TIMES DAILY
Qty: 20 CAPSULE | Refills: 0 | Status: SHIPPED | OUTPATIENT
Start: 2020-03-16 | End: 2020-03-26

## 2020-03-16 RX ORDER — ALBUTEROL SULFATE 90 UG/1
2 AEROSOL, METERED RESPIRATORY (INHALATION) EVERY 4 HOURS PRN
Qty: 18 G | Refills: 0 | Status: SHIPPED | OUTPATIENT
Start: 2020-03-16 | End: 2021-08-23

## 2020-03-16 NOTE — PATIENT INSTRUCTIONS
REST AND HYDRATE WITH PLENTY OF FLUIDS  DOXYCYCLINE RX  CONTINUE PREVIOUSLY PRESCRIBED COUGH MEDICINE  CONTINUE FLONASE  FLU SWAB NEGATIVE  COVID 19 TEST SENT TO LAB AND WE WILL CONTACT YOU WITH RESULTS WHEN THEY RETURN  IBUPROFEN 600 MG EVERY 6 HOURS FOR FEVER/ACHES  TYLENOL 650 MG EVERY 4-6 HOURS FOR FEVER/ACHES  SEE BRONCHITIS SHEET  SOCIAL DISTANCING AND FREQUENT HANDWASHING      Bronchitis, Antibiotic Treatment (Adult)    Bronchitis is an infection of the air passages (bronchial tubes) in your lungs. It often occurs when you have a cold. This illness is contagious during the first few days and is spread through the air by coughing and sneezing, or by direct contact (touching the sick person and then touching your own eyes, nose, or mouth).  Symptoms of bronchitis include cough with mucus (phlegm) and low-grade fever. Bronchitis usually lasts 7 to 14 days. Mild cases can be treated with simple home remedies. More severe infection is treated with an antibiotic.  Home care  Follow these guidelines when caring for yourself at home:  · If your symptoms are severe, rest at home for the first 2 to 3 days. When you go back to your usual activities, don't let yourself get too tired.  · Do not smoke. Also avoid being exposed to secondhand smoke.  · You may use over-the-counter medicines to control fever or pain, unless another medicine was prescribed. (Note: If you have chronic liver or kidney disease or have ever had a stomach ulcer or gastrointestinal bleeding, talk with your healthcare provider before using these medicines. Also talk to your provider if you are taking medicine to prevent blood clots.) Aspirin should never be given to anyone younger than 18 years of age who is ill with a viral infection or fever. It may cause severe liver or brain damage.  · Your appetite may be poor, so a light diet is fine. Avoid dehydration by drinking 6 to 8 glasses of fluids per day (such as water, soft drinks, sports drinks,  juices, tea, or soup). Extra fluids will help loosen secretions in the nose and lungs.  · Over-the-counter cough, cold, and sore-throat medicines will not shorten the length of the illness, but they may be helpful to reduce symptoms. (Note: Do not use decongestants if you have high blood pressure.)  · Finish all antibiotic medicine. Do this even if you are feeling better after only a few days.  Follow-up care  Follow up with your healthcare provider, or as advised. If you had an X-ray or ECG (electrocardiogram), a specialist will review it. You will be notified of any new findings that may affect your care.  Note: If you are age 65 or older, or if you have a chronic lung disease or condition that affects your immune system, or you smoke, talk to your healthcare provider about having pneumococcal vaccinations and a yearly influenza vaccination (flu shot).  When to seek medical advice  Call your healthcare provider right away if any of these occur:  · Fever of 100.4°F (38°C) or higher  · Coughing up increased amounts of colored sputum  · Weakness, drowsiness, headache, facial pain, ear pain, or a stiff neck  Call 911, or get immediate medical care  Contact emergency services right away if any of these occur.  · Coughing up blood  · Worsening weakness, drowsiness, headache, or stiff neck  · Trouble breathing, wheezing, or pain with breathing  Date Last Reviewed: 9/13/2015  © 7050-1779 [employer]. [address] [place] [address], [place] [address]. All rights reserved. This information is not intended as a substitute for professional medical care. Always follow your healthcare professional's instructions.

## 2020-03-16 NOTE — PROGRESS NOTES
"Subjective:       Patient ID: Kath Mcclellan is a 34 y.o. female.    Vitals:  height is 5' 3" (1.6 m) and weight is 69.9 kg (154 lb). Her temperature is 98.3 °F (36.8 °C). Her blood pressure is 121/87 and her pulse is 106. Her respiration is 18 and oxygen saturation is 97%.     Chief Complaint: Cough    Patient is a 34 year old female with no immunocompormised state, was seen last night in er and had chest xray and flu swab, both negative, was rx flonase and promethazine. 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 Reports cough, no wheezing, no sob, sweating/fever. Symptoms present for over 1 week        Cough   This is a new problem. The current episode started yesterday. The problem has been unchanged. The problem occurs hourly. The cough is productive of sputum. Associated symptoms include chills, a fever and myalgias. Pertinent negatives include no ear pain, eye redness, hemoptysis, rash, sore throat, shortness of breath or wheezing. Nothing aggravates the symptoms. Treatments tried: tylenol, promethazine  The treatment provided mild relief.       Constitution: Positive for chills and fever. Negative for sweating and fatigue.   HENT: Negative for ear pain, congestion, sinus pain, sinus pressure, sore throat and voice change.    Neck: Negative for painful lymph nodes.   Eyes: Negative for eye redness.   Respiratory: Positive for cough. Negative for chest tightness, sputum production, bloody sputum, COPD, shortness of breath, stridor, wheezing and asthma.    Gastrointestinal: Negative for nausea and vomiting.   Musculoskeletal: Positive for muscle ache.   Skin: Negative for rash.   Allergic/Immunologic: Negative for seasonal allergies and asthma.   Hematologic/Lymphatic: Negative for swollen lymph nodes.       Objective:      Physical Exam   Constitutional: She is oriented to person, place, " and time. She appears well-developed and well-nourished. She is cooperative.  Non-toxic appearance. She does not have a sickly appearance. She does not appear ill. No distress.   HENT:   Head: Normocephalic and atraumatic.   Right Ear: Hearing, tympanic membrane, external ear and ear canal normal.   Left Ear: Hearing, tympanic membrane, external ear and ear canal normal.   Nose: No mucosal edema, rhinorrhea or nasal deformity. No epistaxis. Right sinus exhibits no maxillary sinus tenderness and no frontal sinus tenderness. Left sinus exhibits no maxillary sinus tenderness and no frontal sinus tenderness.   Mouth/Throat: Uvula is midline, oropharynx is clear and moist and mucous membranes are normal. No trismus in the jaw. Normal dentition. No uvula swelling. No oropharyngeal exudate, posterior oropharyngeal edema or posterior oropharyngeal erythema.   Nasal congestion   Eyes: Conjunctivae and lids are normal. No scleral icterus.   Neck: Trachea normal, full passive range of motion without pain and phonation normal. Neck supple. No neck rigidity. No edema and no erythema present.   Cardiovascular: Normal rate, regular rhythm, normal heart sounds, intact distal pulses and normal pulses.   Pulmonary/Chest: Effort normal and breath sounds normal. No respiratory distress. She has no decreased breath sounds. She has no wheezes. She has no rhonchi. She has no rales.   Dry cough   Abdominal: Normal appearance.   Musculoskeletal: Normal range of motion. She exhibits no edema or deformity.   Neurological: She is alert and oriented to person, place, and time. She exhibits normal muscle tone. Coordination normal.   Skin: Skin is warm, dry, intact, not diaphoretic and not pale.   Psychiatric: She has a normal mood and affect. Her speech is normal. Cognition and memory are normal.   Nursing note and vitals reviewed.      flu swab negative  covid 19 sent to lab    Assessment:       1. Cough    2. Acute bacterial bronchitis         Plan:         Cough  -     POCT Influenza A/B  -     SARS- CoV-2 (COVID-19) QUALITATIVE PCR    Acute bacterial bronchitis    Other orders  -     doxycycline (VIBRAMYCIN) 100 MG Cap; Take 1 capsule (100 mg total) by mouth 2 (two) times daily. for 10 days  Dispense: 20 capsule; Refill: 0  -     albuterol (PROVENTIL/VENTOLIN HFA) 90 mcg/actuation inhaler; Inhale 2 puffs into the lungs every 4 (four) hours as needed for Wheezing or Shortness of Breath. Rescue  Dispense: 18 g; Refill: 0          Patient Instructions   REST AND HYDRATE WITH PLENTY OF FLUIDS  DOXYCYCLINE RX  CONTINUE PREVIOUSLY PRESCRIBED COUGH MEDICINE  CONTINUE FLONASE  FLU SWAB NEGATIVE  COVID 19 TEST SENT TO LAB AND WE WILL CONTACT YOU WITH RESULTS WHEN THEY RETURN  IBUPROFEN 600 MG EVERY 6 HOURS FOR FEVER/ACHES  TYLENOL 650 MG EVERY 4-6 HOURS FOR FEVER/ACHES  SEE BRONCHITIS SHEET  SOCIAL DISTANCING AND FREQUENT HANDWASHING      Bronchitis, Antibiotic Treatment (Adult)    Bronchitis is an infection of the air passages (bronchial tubes) in your lungs. It often occurs when you have a cold. This illness is contagious during the first few days and is spread through the air by coughing and sneezing, or by direct contact (touching the sick person and then touching your own eyes, nose, or mouth).  Symptoms of bronchitis include cough with mucus (phlegm) and low-grade fever. Bronchitis usually lasts 7 to 14 days. Mild cases can be treated with simple home remedies. More severe infection is treated with an antibiotic.  Home care  Follow these guidelines when caring for yourself at home:  · If your symptoms are severe, rest at home for the first 2 to 3 days. When you go back to your usual activities, don't let yourself get too tired.  · Do not smoke. Also avoid being exposed to secondhand smoke.  · You may use over-the-counter medicines to control fever or pain, unless another medicine was prescribed. (Note: If you have chronic liver or kidney disease or have  ever had a stomach ulcer or gastrointestinal bleeding, talk with your healthcare provider before using these medicines. Also talk to your provider if you are taking medicine to prevent blood clots.) Aspirin should never be given to anyone younger than 18 years of age who is ill with a viral infection or fever. It may cause severe liver or brain damage.  · Your appetite may be poor, so a light diet is fine. Avoid dehydration by drinking 6 to 8 glasses of fluids per day (such as water, soft drinks, sports drinks, juices, tea, or soup). Extra fluids will help loosen secretions in the nose and lungs.  · Over-the-counter cough, cold, and sore-throat medicines will not shorten the length of the illness, but they may be helpful to reduce symptoms. (Note: Do not use decongestants if you have high blood pressure.)  · Finish all antibiotic medicine. Do this even if you are feeling better after only a few days.  Follow-up care  Follow up with your healthcare provider, or as advised. If you had an X-ray or ECG (electrocardiogram), a specialist will review it. You will be notified of any new findings that may affect your care.  Note: If you are age 65 or older, or if you have a chronic lung disease or condition that affects your immune system, or you smoke, talk to your healthcare provider about having pneumococcal vaccinations and a yearly influenza vaccination (flu shot).  When to seek medical advice  Call your healthcare provider right away if any of these occur:  · Fever of 100.4°F (38°C) or higher  · Coughing up increased amounts of colored sputum  · Weakness, drowsiness, headache, facial pain, ear pain, or a stiff neck  Call 911, or get immediate medical care  Contact emergency services right away if any of these occur.  · Coughing up blood  · Worsening weakness, drowsiness, headache, or stiff neck  · Trouble breathing, wheezing, or pain with breathing  Date Last Reviewed: 9/13/2015  © 2495-3306 [employer]  [address] [place] [address]. All rights reserved. This information is not intended as a substitute for professional medical care. Always follow your healthcare professional's instructions.

## 2020-03-27 ENCOUNTER — TELEPHONE (OUTPATIENT)
Dept: URGENT CARE | Facility: CLINIC | Age: 35
End: 2020-03-27

## 2020-03-27 LAB
SARS-COV-2 RNA RESP QL NAA+PROBE: DETECTED
SPECIMEN SOURCE: ABNORMAL

## 2020-03-27 NOTE — TELEPHONE ENCOUNTER
Spoke with patient regarding positive covid 19 test. States she feels 100% and has been back at work.

## 2020-04-21 DIAGNOSIS — Z01.84 ANTIBODY RESPONSE EXAMINATION: ICD-10-CM

## 2020-05-07 ENCOUNTER — LAB VISIT (OUTPATIENT)
Dept: LAB | Facility: OTHER | Age: 35
End: 2020-05-07
Attending: INTERNAL MEDICINE
Payer: MEDICAID

## 2020-05-07 DIAGNOSIS — Z20.822 EXPOSURE TO COVID-19 VIRUS: Primary | ICD-10-CM

## 2020-05-07 DIAGNOSIS — Z86.16 HISTORY OF 2019 NOVEL CORONAVIRUS DISEASE (COVID-19): ICD-10-CM

## 2020-05-07 DIAGNOSIS — Z01.84 ANTIBODY RESPONSE EXAMINATION: ICD-10-CM

## 2020-05-07 LAB — SARS-COV-2 IGG SERPLBLD QL IA.RAPID: POSITIVE

## 2020-05-07 PROCEDURE — 36415 COLL VENOUS BLD VENIPUNCTURE: CPT

## 2020-05-07 PROCEDURE — 86769 SARS-COV-2 COVID-19 ANTIBODY: CPT

## 2020-05-12 ENCOUNTER — LAB VISIT (OUTPATIENT)
Dept: URGENT CARE | Facility: CLINIC | Age: 35
End: 2020-05-12
Payer: MEDICAID

## 2020-05-12 VITALS
RESPIRATION RATE: 18 BRPM | HEART RATE: 98 BPM | TEMPERATURE: 98 F | OXYGEN SATURATION: 98 % | SYSTOLIC BLOOD PRESSURE: 127 MMHG | DIASTOLIC BLOOD PRESSURE: 88 MMHG

## 2020-05-12 DIAGNOSIS — Z20.822 EXPOSURE TO COVID-19 VIRUS: ICD-10-CM

## 2020-05-12 DIAGNOSIS — Z86.16 HISTORY OF 2019 NOVEL CORONAVIRUS DISEASE (COVID-19): ICD-10-CM

## 2020-05-12 PROCEDURE — U0002 COVID-19 LAB TEST NON-CDC: HCPCS

## 2020-05-12 NOTE — PROGRESS NOTES
Subjective:       Patient ID: Kath Mcclellan is a 35 y.o. female.    Vitals:  temperature is 98 °F (36.7 °C). Her blood pressure is 127/88 and her pulse is 98. Her respiration is 18 and oxygen saturation is 98%.     Chief Complaint: COVID-19 Concerns    Patient is here for a COVID swab. She works on a [employer] unit for [employer]       Constitution: Negative for chills, sweating, fatigue and fever.   HENT: Negative for ear pain, congestion, sinus pain, sinus pressure, sore throat and voice change.    Neck: Negative for painful lymph nodes.   Eyes: Negative for eye redness.   Respiratory: Negative for chest tightness, cough, sputum production, bloody sputum, COPD, shortness of breath, stridor, wheezing and asthma.    Gastrointestinal: Negative for nausea and vomiting.   Musculoskeletal: Negative for muscle ache.   Skin: Negative for rash.   Allergic/Immunologic: Negative for seasonal allergies and asthma.   Hematologic/Lymphatic: Negative for swollen lymph nodes.       Objective:      Physical Exam      Assessment:       1. Exposure to Covid-19 Virus    2. History of 2019 novel coronavirus disease (COVID-19)        Plan:         Exposure to Covid-19 Virus  -     COVID-19 Routine Screening    History of 2019 novel coronavirus disease (COVID-19)  -     COVID-19 Routine Screening

## 2020-05-13 LAB — SARS-COV-2 RNA RESP QL NAA+PROBE: NOT DETECTED

## 2020-10-12 RX ORDER — METRONIDAZOLE 500 MG/1
500 TABLET ORAL EVERY 12 HOURS
Qty: 20 TABLET | Refills: 0 | Status: SHIPPED | OUTPATIENT
Start: 2020-10-12 | End: 2021-02-06

## 2020-11-02 RX ORDER — PROMETHAZINE HYDROCHLORIDE AND CODEINE PHOSPHATE 6.25; 1 MG/5ML; MG/5ML
5 SOLUTION ORAL EVERY 4 HOURS PRN
Qty: 210 ML | Refills: 0 | Status: SHIPPED | OUTPATIENT
Start: 2020-11-02 | End: 2020-11-12

## 2020-11-30 ENCOUNTER — LAB VISIT (OUTPATIENT)
Dept: LAB | Facility: OTHER | Age: 35
End: 2020-11-30
Attending: SPECIALIST
Payer: MEDICAID

## 2020-11-30 DIAGNOSIS — Z86.16 HISTORY OF 2019 NOVEL CORONAVIRUS DISEASE (COVID-19): ICD-10-CM

## 2020-11-30 DIAGNOSIS — Z03.818 ENCOUNTER FOR OBSERVATION FOR SUSPECTED EXPOSURE TO OTHER BIOLOGICAL AGENTS RULED OUT: ICD-10-CM

## 2020-11-30 DIAGNOSIS — Z20.822 EXPOSURE TO COVID-19 VIRUS: ICD-10-CM

## 2020-11-30 DIAGNOSIS — Z01.84 ENCOUNTER FOR ANTIBODY RESPONSE EXAMINATION: ICD-10-CM

## 2020-11-30 DIAGNOSIS — Z86.16 HISTORY OF 2019 NOVEL CORONAVIRUS DISEASE (COVID-19): Primary | ICD-10-CM

## 2020-11-30 PROCEDURE — 36415 COLL VENOUS BLD VENIPUNCTURE: CPT

## 2020-11-30 PROCEDURE — 86769 SARS-COV-2 COVID-19 ANTIBODY: CPT

## 2020-12-01 LAB — SARS-COV-2 IGG SERPLBLD QL IA.RAPID: NEGATIVE

## 2020-12-02 ENCOUNTER — LAB VISIT (OUTPATIENT)
Dept: URGENT CARE | Facility: CLINIC | Age: 35
End: 2020-12-02
Payer: MEDICAID

## 2020-12-02 DIAGNOSIS — Z86.16 HISTORY OF 2019 NOVEL CORONAVIRUS DISEASE (COVID-19): ICD-10-CM

## 2020-12-02 DIAGNOSIS — Z03.818 ENCOUNTER FOR OBSERVATION FOR SUSPECTED EXPOSURE TO OTHER BIOLOGICAL AGENTS RULED OUT: ICD-10-CM

## 2020-12-02 DIAGNOSIS — Z20.822 EXPOSURE TO COVID-19 VIRUS: ICD-10-CM

## 2020-12-02 LAB
CTP QC/QA: YES
SARS-COV-2 RDRP RESP QL NAA+PROBE: NEGATIVE

## 2020-12-02 PROCEDURE — 99211 OFF/OP EST MAY X REQ PHY/QHP: CPT | Mod: S$GLB,,, | Performed by: FAMILY MEDICINE

## 2020-12-02 PROCEDURE — 87635 SARS-COV-2 COVID-19 AMP PRB: CPT | Mod: QW,S$GLB,, | Performed by: FAMILY MEDICINE

## 2020-12-02 PROCEDURE — 99211 PR OFFICE/OUTPT VISIT, EST, LEVL I: ICD-10-PCS | Mod: S$GLB,,, | Performed by: FAMILY MEDICINE

## 2020-12-02 PROCEDURE — 87635: ICD-10-PCS | Mod: QW,S$GLB,, | Performed by: FAMILY MEDICINE

## 2020-12-02 NOTE — PROGRESS NOTES
"Subjective:       Patient ID: Kath Mcclellan is a 35 y.o. female.    Vitals:  vitals were not taken for this visit.     Chief Complaint: essential    You have tested negative for COVID-19 today. If you did not have a close exposure (as defined below) you can return to your normal daily activities to include social distancing, wearing a mask and frequent handwashing.     A "close exposure" is defined as anyone who has had an exposure (masked or unmasked) to a known COVID -19 positive person within 6 ft for longer than 15 minutes. If your exposure meets this definition you are required by CDC guidelines to quarantine for 14 days from time of exposure. CDC now states that a test can be performed for an asymptomatic patient (someone who does not have any symptoms) after a close exposure, and that a test should be done if you develop symptoms after a close exposure as described above.     If you developed symptoms since the exposure, and your test was negative today, you still have to quarantine for 14 days from the date of the exposure.     The 14-day quarantine begins from the day you were exposed, not the day of your test. For example, if your exposure was on a Monday, and you waited until Friday of the same week to get tested and it was negative, your 14-day quarantine begins from that Monday, not the Friday you tested negative.     So, if you meet the definition of a close exposure, it will not matter whether you are experiencing symptoms-quarantine for 14 days after close exposure is required by CDC guidelines regardless of test status- a negative test does not shorten the quarantine period.    ROS    Objective:      Physical Exam      Assessment:       1. History of 2019 novel coronavirus disease (COVID-19)    2. Exposure to COVID-19 virus    3. Encounter for observation for suspected exposure to other biological agents ruled out        Plan:         History of 2019 novel coronavirus disease (COVID-19)  -     " COVID-19 Routine Screening    Exposure to COVID-19 virus  -     COVID-19 Routine Screening    Encounter for observation for suspected exposure to other biological agents ruled out  -     COVID-19 Routine Screening

## 2021-02-01 ENCOUNTER — CLINICAL SUPPORT (OUTPATIENT)
Dept: URGENT CARE | Facility: CLINIC | Age: 36
End: 2021-02-01

## 2021-02-01 DIAGNOSIS — R06.02 SHORTNESS OF BREATH: ICD-10-CM

## 2021-02-01 DIAGNOSIS — R05.9 COUGH: ICD-10-CM

## 2021-02-02 ENCOUNTER — LAB VISIT (OUTPATIENT)
Dept: INTERNAL MEDICINE | Facility: CLINIC | Age: 36
End: 2021-02-02
Payer: MEDICAID

## 2021-02-02 DIAGNOSIS — Z01.818 PRE-OP TESTING: ICD-10-CM

## 2021-02-02 DIAGNOSIS — R05.9 COUGH: ICD-10-CM

## 2021-02-02 PROCEDURE — U0003 INFECTIOUS AGENT DETECTION BY NUCLEIC ACID (DNA OR RNA); SEVERE ACUTE RESPIRATORY SYNDROME CORONAVIRUS 2 (SARS-COV-2) (CORONAVIRUS DISEASE [COVID-19]), AMPLIFIED PROBE TECHNIQUE, MAKING USE OF HIGH THROUGHPUT TECHNOLOGIES AS DESCRIBED BY CMS-2020-01-R: HCPCS

## 2021-02-03 LAB — SARS-COV-2 RNA RESP QL NAA+PROBE: NOT DETECTED

## 2021-02-05 ENCOUNTER — OFFICE VISIT (OUTPATIENT)
Dept: OTOLARYNGOLOGY | Facility: CLINIC | Age: 36
End: 2021-02-05
Payer: MEDICAID

## 2021-02-05 VITALS
SYSTOLIC BLOOD PRESSURE: 127 MMHG | BODY MASS INDEX: 27.36 KG/M2 | HEART RATE: 86 BPM | DIASTOLIC BLOOD PRESSURE: 62 MMHG | TEMPERATURE: 98 F | WEIGHT: 154.44 LBS

## 2021-02-05 DIAGNOSIS — R13.10 DYSPHAGIA, UNSPECIFIED TYPE: Primary | ICD-10-CM

## 2021-02-05 DIAGNOSIS — J30.9 ALLERGIC RHINITIS, UNSPECIFIED SEASONALITY, UNSPECIFIED TRIGGER: ICD-10-CM

## 2021-02-05 DIAGNOSIS — R09.89 PHLEGM IN THROAT: ICD-10-CM

## 2021-02-05 DIAGNOSIS — R09.89 THROAT CLEARING: ICD-10-CM

## 2021-02-05 DIAGNOSIS — R09.82 POSTNASAL DRIP: ICD-10-CM

## 2021-02-05 DIAGNOSIS — J34.2 NASAL SEPTAL DEVIATION: ICD-10-CM

## 2021-02-05 PROCEDURE — 31575 DIAGNOSTIC LARYNGOSCOPY: CPT | Mod: PBBFAC,PN | Performed by: SPECIALIST

## 2021-02-05 PROCEDURE — 99204 OFFICE O/P NEW MOD 45 MIN: CPT | Mod: 25,S$PBB,, | Performed by: SPECIALIST

## 2021-02-05 PROCEDURE — 99204 PR OFFICE/OUTPT VISIT, NEW, LEVL IV, 45-59 MIN: ICD-10-PCS | Mod: 25,S$PBB,, | Performed by: SPECIALIST

## 2021-02-05 PROCEDURE — 99999 PR PBB SHADOW E&M-EST. PATIENT-LVL III: ICD-10-PCS | Mod: PBBFAC,,, | Performed by: SPECIALIST

## 2021-02-05 PROCEDURE — 99999 PR PBB SHADOW E&M-EST. PATIENT-LVL III: CPT | Mod: PBBFAC,,, | Performed by: SPECIALIST

## 2021-02-05 PROCEDURE — 99213 OFFICE O/P EST LOW 20 MIN: CPT | Mod: PBBFAC,PN | Performed by: SPECIALIST

## 2021-02-05 PROCEDURE — 31575 LARYNGOSCOPY: ICD-10-PCS | Mod: S$PBB,,, | Performed by: SPECIALIST

## 2021-02-05 RX ORDER — IPRATROPIUM BROMIDE 21 UG/1
2 SPRAY, METERED NASAL 2 TIMES DAILY
Qty: 30 ML | Refills: 11 | Status: SHIPPED | OUTPATIENT
Start: 2021-02-05 | End: 2022-05-20

## 2021-03-03 ENCOUNTER — IMMUNIZATION (OUTPATIENT)
Dept: PRIMARY CARE CLINIC | Facility: CLINIC | Age: 36
End: 2021-03-03

## 2021-03-03 DIAGNOSIS — Z23 NEED FOR VACCINATION: Primary | ICD-10-CM

## 2021-03-03 PROCEDURE — 91300 PR SARS-COV- 2 COVID-19 VACCINE, NO PRSV, 30MCG/0.3ML, IM: CPT | Mod: S$GLB,,, | Performed by: INTERNAL MEDICINE

## 2021-03-03 PROCEDURE — 0001A PR IMMUNIZ ADMIN, SARS-COV-2 COVID-19 VACC, 30MCG/0.3ML, 1ST DOSE: CPT | Mod: CV19,S$GLB,, | Performed by: INTERNAL MEDICINE

## 2021-03-03 PROCEDURE — 91300 PR SARS-COV- 2 COVID-19 VACCINE, NO PRSV, 30MCG/0.3ML, IM: ICD-10-PCS | Mod: S$GLB,,, | Performed by: INTERNAL MEDICINE

## 2021-03-03 PROCEDURE — 0001A PR IMMUNIZ ADMIN, SARS-COV-2 COVID-19 VACC, 30MCG/0.3ML, 1ST DOSE: ICD-10-PCS | Mod: CV19,S$GLB,, | Performed by: INTERNAL MEDICINE

## 2021-03-03 RX ADMIN — Medication 0.3 ML: at 08:03

## 2021-03-26 ENCOUNTER — IMMUNIZATION (OUTPATIENT)
Dept: PRIMARY CARE CLINIC | Facility: CLINIC | Age: 36
End: 2021-03-26

## 2021-03-26 DIAGNOSIS — Z23 NEED FOR VACCINATION: Primary | ICD-10-CM

## 2021-03-26 PROCEDURE — 0002A PR IMMUNIZ ADMIN, SARS-COV-2 COVID-19 VACC, 30MCG/0.3ML, 2ND DOSE: CPT | Mod: CV19,S$GLB,, | Performed by: INTERNAL MEDICINE

## 2021-03-26 PROCEDURE — 0002A PR IMMUNIZ ADMIN, SARS-COV-2 COVID-19 VACC, 30MCG/0.3ML, 2ND DOSE: ICD-10-PCS | Mod: CV19,S$GLB,, | Performed by: INTERNAL MEDICINE

## 2021-03-26 PROCEDURE — 91300 PR SARS-COV- 2 COVID-19 VACCINE, NO PRSV, 30MCG/0.3ML, IM: CPT | Mod: S$GLB,,, | Performed by: INTERNAL MEDICINE

## 2021-03-26 PROCEDURE — 91300 PR SARS-COV- 2 COVID-19 VACCINE, NO PRSV, 30MCG/0.3ML, IM: ICD-10-PCS | Mod: S$GLB,,, | Performed by: INTERNAL MEDICINE

## 2021-03-26 RX ADMIN — Medication 0.3 ML: at 08:03

## 2021-07-27 RX ORDER — AZITHROMYCIN 500 MG/1
500 TABLET, FILM COATED ORAL DAILY
Qty: 3 TABLET | Refills: 1 | Status: SHIPPED | OUTPATIENT
Start: 2021-07-27 | End: 2021-07-30

## 2021-08-23 DIAGNOSIS — Z00.00 WELLNESS EXAMINATION: Primary | ICD-10-CM

## 2021-08-23 DIAGNOSIS — Z13.89 SCREENING FOR MULTIPLE CONDITIONS: ICD-10-CM

## 2021-08-23 RX ORDER — CETIRIZINE HYDROCHLORIDE 10 MG/1
10 TABLET ORAL DAILY
Qty: 30 TABLET | Refills: 11
Start: 2021-08-23 | End: 2021-10-01 | Stop reason: SDUPTHER

## 2021-08-23 RX ORDER — METRONIDAZOLE 500 MG/1
500 TABLET ORAL EVERY 12 HOURS
Qty: 14 TABLET | Refills: 1 | Status: SHIPPED | OUTPATIENT
Start: 2021-08-23 | End: 2021-08-30

## 2021-08-24 ENCOUNTER — LAB VISIT (OUTPATIENT)
Dept: LAB | Facility: HOSPITAL | Age: 36
End: 2021-08-24
Attending: SPECIALIST
Payer: MEDICAID

## 2021-08-24 DIAGNOSIS — Z00.00 WELLNESS EXAMINATION: ICD-10-CM

## 2021-08-24 DIAGNOSIS — Z13.89 SCREENING FOR MULTIPLE CONDITIONS: ICD-10-CM

## 2021-08-24 LAB
ALBUMIN SERPL BCP-MCNC: 3.6 G/DL (ref 3.5–5.2)
ALP SERPL-CCNC: 49 U/L (ref 55–135)
ALT SERPL W/O P-5'-P-CCNC: 22 U/L (ref 10–44)
ANION GAP SERPL CALC-SCNC: 5 MMOL/L (ref 8–16)
AST SERPL-CCNC: 17 U/L (ref 10–40)
BASOPHILS # BLD AUTO: 0.03 K/UL (ref 0–0.2)
BASOPHILS NFR BLD: 0.6 % (ref 0–1.9)
BILIRUB SERPL-MCNC: 0.8 MG/DL (ref 0.1–1)
BUN SERPL-MCNC: 13 MG/DL (ref 6–20)
CALCIUM SERPL-MCNC: 9.1 MG/DL (ref 8.7–10.5)
CHLORIDE SERPL-SCNC: 103 MMOL/L (ref 95–110)
CHOLEST SERPL-MCNC: 150 MG/DL (ref 120–199)
CHOLEST/HDLC SERPL: 2.6 {RATIO} (ref 2–5)
CO2 SERPL-SCNC: 27 MMOL/L (ref 23–29)
CREAT SERPL-MCNC: 0.8 MG/DL (ref 0.5–1.4)
DIFFERENTIAL METHOD: ABNORMAL
EOSINOPHIL # BLD AUTO: 0.3 K/UL (ref 0–0.5)
EOSINOPHIL NFR BLD: 6.7 % (ref 0–8)
ERYTHROCYTE [DISTWIDTH] IN BLOOD BY AUTOMATED COUNT: 13 % (ref 11.5–14.5)
ERYTHROCYTE [SEDIMENTATION RATE] IN BLOOD BY WESTERGREN METHOD: 7 MM/HR (ref 0–36)
EST. GFR  (AFRICAN AMERICAN): >60 ML/MIN/1.73 M^2
EST. GFR  (NON AFRICAN AMERICAN): >60 ML/MIN/1.73 M^2
GLUCOSE SERPL-MCNC: 84 MG/DL (ref 70–110)
HCT VFR BLD AUTO: 38.3 % (ref 37–48.5)
HDLC SERPL-MCNC: 58 MG/DL (ref 40–75)
HDLC SERPL: 38.7 % (ref 20–50)
HGB BLD-MCNC: 11.8 G/DL (ref 12–16)
IMM GRANULOCYTES # BLD AUTO: 0.01 K/UL (ref 0–0.04)
IMM GRANULOCYTES NFR BLD AUTO: 0.2 % (ref 0–0.5)
LDLC SERPL CALC-MCNC: 76.8 MG/DL (ref 63–159)
LYMPHOCYTES # BLD AUTO: 1.9 K/UL (ref 1–4.8)
LYMPHOCYTES NFR BLD: 38.7 % (ref 18–48)
MCH RBC QN AUTO: 29.4 PG (ref 27–31)
MCHC RBC AUTO-ENTMCNC: 30.8 G/DL (ref 32–36)
MCV RBC AUTO: 95 FL (ref 82–98)
MONOCYTES # BLD AUTO: 0.4 K/UL (ref 0.3–1)
MONOCYTES NFR BLD: 8.3 % (ref 4–15)
NEUTROPHILS # BLD AUTO: 2.3 K/UL (ref 1.8–7.7)
NEUTROPHILS NFR BLD: 45.5 % (ref 38–73)
NONHDLC SERPL-MCNC: 92 MG/DL
NRBC BLD-RTO: 0 /100 WBC
PLATELET # BLD AUTO: 370 K/UL (ref 150–450)
PMV BLD AUTO: 9.2 FL (ref 9.2–12.9)
POTASSIUM SERPL-SCNC: 4.2 MMOL/L (ref 3.5–5.1)
PROT SERPL-MCNC: 6.5 G/DL (ref 6–8.4)
RBC # BLD AUTO: 4.02 M/UL (ref 4–5.4)
SODIUM SERPL-SCNC: 135 MMOL/L (ref 136–145)
TRIGL SERPL-MCNC: 76 MG/DL (ref 30–150)
TSH SERPL DL<=0.005 MIU/L-ACNC: 2.76 UIU/ML (ref 0.4–4)
WBC # BLD AUTO: 4.94 K/UL (ref 3.9–12.7)

## 2021-08-24 PROCEDURE — 36415 COLL VENOUS BLD VENIPUNCTURE: CPT | Mod: PN | Performed by: SPECIALIST

## 2021-08-24 PROCEDURE — 85025 COMPLETE CBC W/AUTO DIFF WBC: CPT | Performed by: SPECIALIST

## 2021-08-24 PROCEDURE — 80053 COMPREHEN METABOLIC PANEL: CPT | Performed by: SPECIALIST

## 2021-08-24 PROCEDURE — 85652 RBC SED RATE AUTOMATED: CPT | Performed by: SPECIALIST

## 2021-08-24 PROCEDURE — 84443 ASSAY THYROID STIM HORMONE: CPT | Performed by: SPECIALIST

## 2021-08-24 PROCEDURE — 80061 LIPID PANEL: CPT | Performed by: SPECIALIST

## 2021-10-01 RX ORDER — CETIRIZINE HYDROCHLORIDE 10 MG/1
10 TABLET ORAL DAILY
Qty: 30 TABLET | Refills: 11 | Status: SHIPPED | OUTPATIENT
Start: 2021-10-01 | End: 2022-10-01

## 2021-12-01 ENCOUNTER — IMMUNIZATION (OUTPATIENT)
Dept: INTERNAL MEDICINE | Facility: CLINIC | Age: 36
End: 2021-12-01
Payer: MEDICAID

## 2021-12-01 DIAGNOSIS — Z23 NEED FOR VACCINATION: Primary | ICD-10-CM

## 2021-12-01 PROCEDURE — 0003A COVID-19, MRNA, LNP-S, PF, 30 MCG/0.3 ML DOSE VACCINE: CPT | Mod: PBBFAC

## 2021-12-01 PROCEDURE — 91300 COVID-19, MRNA, LNP-S, PF, 30 MCG/0.3 ML DOSE VACCINE: CPT | Mod: PBBFAC

## 2021-12-27 ENCOUNTER — PATIENT MESSAGE (OUTPATIENT)
Dept: ADMINISTRATIVE | Facility: OTHER | Age: 36
End: 2021-12-27
Payer: MEDICAID

## 2021-12-27 DIAGNOSIS — R52 BODY ACHES: Primary | ICD-10-CM

## 2021-12-28 ENCOUNTER — LAB VISIT (OUTPATIENT)
Dept: PRIMARY CARE CLINIC | Facility: OTHER | Age: 36
End: 2021-12-28

## 2021-12-28 LAB
CTP QC/QA: YES
SARS-COV-2 AG RESP QL IA.RAPID: NEGATIVE

## 2021-12-30 ENCOUNTER — LAB VISIT (OUTPATIENT)
Dept: PRIMARY CARE CLINIC | Facility: OTHER | Age: 36
End: 2021-12-30
Payer: MEDICAID

## 2021-12-30 ENCOUNTER — PATIENT MESSAGE (OUTPATIENT)
Dept: ADMINISTRATIVE | Facility: OTHER | Age: 36
End: 2021-12-30
Payer: MEDICAID

## 2021-12-30 DIAGNOSIS — R50.9 FEVER, UNSPECIFIED FEVER CAUSE: Primary | ICD-10-CM

## 2021-12-30 DIAGNOSIS — Z87.81 S/P TIBIAL FRACTURE: Primary | ICD-10-CM

## 2021-12-30 DIAGNOSIS — R05.9 COUGH: Primary | ICD-10-CM

## 2021-12-30 DIAGNOSIS — M25.569 KNEE PAIN, UNSPECIFIED CHRONICITY, UNSPECIFIED LATERALITY: ICD-10-CM

## 2021-12-30 DIAGNOSIS — Z96.9 RETAINED ORTHOPEDIC HARDWARE: ICD-10-CM

## 2021-12-30 LAB
CTP QC/QA: YES
SARS-COV-2 AG RESP QL IA.RAPID: NEGATIVE

## 2022-02-21 ENCOUNTER — PATIENT MESSAGE (OUTPATIENT)
Dept: RESEARCH | Facility: HOSPITAL | Age: 37
End: 2022-02-21
Payer: MEDICAID

## 2022-05-17 RX ORDER — METRONIDAZOLE 500 MG/1
500 TABLET ORAL
Status: DISCONTINUED | OUTPATIENT
Start: 2022-05-17 | End: 2022-05-20

## 2022-05-20 RX ORDER — METRONIDAZOLE 500 MG/1
500 TABLET ORAL EVERY 12 HOURS
Qty: 20 TABLET | Refills: 0 | Status: SHIPPED | OUTPATIENT
Start: 2022-05-20

## 2022-06-15 ENCOUNTER — IMMUNIZATION (OUTPATIENT)
Dept: INTERNAL MEDICINE | Facility: CLINIC | Age: 37
End: 2022-06-15
Payer: MEDICAID

## 2022-06-15 DIAGNOSIS — Z23 NEED FOR VACCINATION: Primary | ICD-10-CM

## 2022-06-15 PROCEDURE — 91305 COVID-19, MRNA, LNP-S, PF, 30 MCG/0.3 ML DOSE VACCINE (PFIZER): CPT | Mod: PBBFAC

## 2022-07-14 RX ORDER — PROMETHAZINE HYDROCHLORIDE AND CODEINE PHOSPHATE 6.25; 1 MG/5ML; MG/5ML
5 SOLUTION ORAL EVERY 4 HOURS PRN
Qty: 210 ML | Refills: 0 | Status: SHIPPED | OUTPATIENT
Start: 2022-07-14 | End: 2022-07-24

## 2023-11-04 RX ORDER — NITROFURANTOIN 25; 75 MG/1; MG/1
100 CAPSULE ORAL 2 TIMES DAILY
Qty: 10 CAPSULE | Refills: 0 | Status: SHIPPED | OUTPATIENT
Start: 2023-11-04 | End: 2023-11-09

## 2024-04-18 RX ORDER — FLUCONAZOLE 150 MG/1
150 TABLET ORAL DAILY
Qty: 2 TABLET | Refills: 0 | Status: SHIPPED | OUTPATIENT
Start: 2024-04-18 | End: 2024-04-20

## 2024-06-14 DIAGNOSIS — Z87.19 HISTORY OF DIVERTICULITIS OF COLON: Primary | ICD-10-CM

## 2025-08-18 ENCOUNTER — HOSPITAL ENCOUNTER (EMERGENCY)
Facility: OTHER | Age: 40
Discharge: HOME | End: 2025-08-19
Attending: EMERGENCY MEDICINE
Payer: COMMERCIAL

## 2025-08-18 DIAGNOSIS — R10.84 GENERALIZED ABDOMINAL CRAMPING: Primary | ICD-10-CM

## 2025-08-18 DIAGNOSIS — R11.2 NAUSEA AND VOMITING, UNSPECIFIED VOMITING TYPE: ICD-10-CM

## 2025-08-18 LAB
ABSOLUTE EOSINOPHIL (OHS): 0.09 K/UL
ABSOLUTE MONOCYTE (OHS): 0.59 K/UL (ref 0.3–1)
ABSOLUTE NEUTROPHIL COUNT (OHS): 7.81 K/UL (ref 1.8–7.7)
BASOPHILS # BLD AUTO: 0.02 K/UL
BASOPHILS NFR BLD AUTO: 0.2 %
ERYTHROCYTE [DISTWIDTH] IN BLOOD BY AUTOMATED COUNT: 13.3 % (ref 11.5–14.5)
HCT VFR BLD AUTO: 35.9 % (ref 37–48.5)
HGB BLD-MCNC: 11.5 GM/DL (ref 12–16)
IMM GRANULOCYTES # BLD AUTO: 0.03 K/UL (ref 0–0.04)
IMM GRANULOCYTES NFR BLD AUTO: 0.3 % (ref 0–0.5)
LYMPHOCYTES # BLD AUTO: 1.22 K/UL (ref 1–4.8)
MCH RBC QN AUTO: 29.9 PG (ref 27–31)
MCHC RBC AUTO-ENTMCNC: 32 G/DL (ref 32–36)
MCV RBC AUTO: 94 FL (ref 82–98)
NUCLEATED RBC (/100WBC) (OHS): 0 /100 WBC
PLATELET # BLD AUTO: 397 K/UL (ref 150–450)
PMV BLD AUTO: 8.6 FL (ref 9.2–12.9)
RBC # BLD AUTO: 3.84 M/UL (ref 4–5.4)
RELATIVE EOSINOPHIL (OHS): 0.9 %
RELATIVE LYMPHOCYTE (OHS): 12.5 % (ref 18–48)
RELATIVE MONOCYTE (OHS): 6 % (ref 4–15)
RELATIVE NEUTROPHIL (OHS): 80.1 % (ref 38–73)
WBC # BLD AUTO: 9.76 K/UL (ref 3.9–12.7)

## 2025-08-18 PROCEDURE — 81025 URINE PREGNANCY TEST: CPT | Performed by: EMERGENCY MEDICINE

## 2025-08-18 PROCEDURE — 83690 ASSAY OF LIPASE: CPT | Performed by: EMERGENCY MEDICINE

## 2025-08-18 PROCEDURE — 80053 COMPREHEN METABOLIC PANEL: CPT | Performed by: EMERGENCY MEDICINE

## 2025-08-18 PROCEDURE — 85025 COMPLETE CBC W/AUTO DIFF WBC: CPT | Performed by: EMERGENCY MEDICINE

## 2025-08-18 PROCEDURE — 81003 URINALYSIS AUTO W/O SCOPE: CPT | Performed by: EMERGENCY MEDICINE

## 2025-08-18 PROCEDURE — 99285 EMERGENCY DEPT VISIT HI MDM: CPT

## 2025-08-18 RX ORDER — HYOSCYAMINE SULFATE 0.5 MG/ML
0.5 INJECTION, SOLUTION SUBCUTANEOUS
Status: COMPLETED | OUTPATIENT
Start: 2025-08-19 | End: 2025-08-19

## 2025-08-18 RX ORDER — ONDANSETRON HYDROCHLORIDE 2 MG/ML
4 INJECTION, SOLUTION INTRAVENOUS
Status: COMPLETED | OUTPATIENT
Start: 2025-08-18 | End: 2025-08-19

## 2025-08-18 RX ORDER — SODIUM CHLORIDE 9 MG/ML
1000 INJECTION, SOLUTION INTRAVENOUS
Status: COMPLETED | OUTPATIENT
Start: 2025-08-18 | End: 2025-08-19

## 2025-08-19 VITALS
TEMPERATURE: 99 F | SYSTOLIC BLOOD PRESSURE: 111 MMHG | OXYGEN SATURATION: 99 % | DIASTOLIC BLOOD PRESSURE: 65 MMHG | RESPIRATION RATE: 19 BRPM | HEART RATE: 73 BPM

## 2025-08-19 LAB
ALBUMIN SERPL BCP-MCNC: 3.8 G/DL (ref 3.5–5.2)
ALP SERPL-CCNC: 54 UNIT/L (ref 40–150)
ALT SERPL W/O P-5'-P-CCNC: 24 UNIT/L (ref 10–44)
ANION GAP (OHS): 7 MMOL/L (ref 8–16)
AST SERPL-CCNC: 33 UNIT/L (ref 11–45)
B-HCG UR QL: NEGATIVE
BILIRUB SERPL-MCNC: 0.8 MG/DL (ref 0.1–1)
BILIRUB UR QL STRIP.AUTO: NEGATIVE
BUN SERPL-MCNC: 18 MG/DL (ref 6–20)
CALCIUM SERPL-MCNC: 8.7 MG/DL (ref 8.7–10.5)
CHLORIDE SERPL-SCNC: 108 MMOL/L (ref 95–110)
CLARITY UR: CLEAR
CO2 SERPL-SCNC: 22 MMOL/L (ref 23–29)
COLOR UR AUTO: YELLOW
CREAT SERPL-MCNC: 0.8 MG/DL (ref 0.5–1.4)
CTP QC/QA: YES
GFR SERPLBLD CREATININE-BSD FMLA CKD-EPI: >60 ML/MIN/1.73/M2
GLUCOSE SERPL-MCNC: 93 MG/DL (ref 70–110)
GLUCOSE UR QL STRIP: NEGATIVE
HGB UR QL STRIP: NEGATIVE
KETONES UR QL STRIP: NEGATIVE
LEUKOCYTE ESTERASE UR QL STRIP: NEGATIVE
LIPASE SERPL-CCNC: 17 U/L (ref 4–60)
NITRITE UR QL STRIP: NEGATIVE
PH UR STRIP: 6 [PH]
POTASSIUM SERPL-SCNC: 4.1 MMOL/L (ref 3.5–5.1)
PROT SERPL-MCNC: 6.6 GM/DL (ref 6–8.4)
PROT UR QL STRIP: NEGATIVE
SODIUM SERPL-SCNC: 137 MMOL/L (ref 136–145)
SP GR UR STRIP: >=1.03
UROBILINOGEN UR STRIP-ACNC: ABNORMAL EU/DL

## 2025-08-19 PROCEDURE — 96361 HYDRATE IV INFUSION ADD-ON: CPT

## 2025-08-19 PROCEDURE — 96374 THER/PROPH/DIAG INJ IV PUSH: CPT

## 2025-08-19 PROCEDURE — 25000003 PHARM REV CODE 250: Performed by: EMERGENCY MEDICINE

## 2025-08-19 PROCEDURE — 25500020 PHARM REV CODE 255: Performed by: EMERGENCY MEDICINE

## 2025-08-19 PROCEDURE — 63600175 PHARM REV CODE 636 W HCPCS: Performed by: EMERGENCY MEDICINE

## 2025-08-19 RX ORDER — DICYCLOMINE HYDROCHLORIDE 20 MG/1
20 TABLET ORAL 2 TIMES DAILY PRN
Qty: 4 TABLET | Refills: 0 | Status: SHIPPED | OUTPATIENT
Start: 2025-08-19 | End: 2025-08-21

## 2025-08-19 RX ORDER — ONDANSETRON 8 MG/1
8 TABLET, ORALLY DISINTEGRATING ORAL EVERY 6 HOURS PRN
Qty: 15 TABLET | Refills: 0 | Status: SHIPPED | OUTPATIENT
Start: 2025-08-19

## 2025-08-19 RX ORDER — ONDANSETRON 8 MG/1
8 TABLET, ORALLY DISINTEGRATING ORAL EVERY 6 HOURS PRN
Qty: 15 TABLET | Refills: 0 | Status: SHIPPED | OUTPATIENT
Start: 2025-08-19 | End: 2025-08-19

## 2025-08-19 RX ADMIN — ONDANSETRON 4 MG: 2 INJECTION INTRAMUSCULAR; INTRAVENOUS at 12:08

## 2025-08-19 RX ADMIN — HYOSCYAMINE SULFATE 0.5 MG: 0.5 INJECTION, SOLUTION SUBCUTANEOUS at 12:08

## 2025-08-19 RX ADMIN — IOHEXOL 75 ML: 350 INJECTION, SOLUTION INTRAVENOUS at 12:08

## 2025-08-19 RX ADMIN — SODIUM CHLORIDE 1000 ML: 9 INJECTION, SOLUTION INTRAVENOUS at 12:08

## 2025-08-20 LAB — HOLD SPECIMEN: NORMAL
